# Patient Record
Sex: FEMALE | Race: ASIAN | NOT HISPANIC OR LATINO | Employment: OTHER | URBAN - METROPOLITAN AREA
[De-identification: names, ages, dates, MRNs, and addresses within clinical notes are randomized per-mention and may not be internally consistent; named-entity substitution may affect disease eponyms.]

---

## 2017-01-05 ENCOUNTER — APPOINTMENT (OUTPATIENT)
Dept: PHYSICAL THERAPY | Facility: MEDICAL CENTER | Age: 57
End: 2017-01-05
Payer: COMMERCIAL

## 2017-01-05 PROCEDURE — 97110 THERAPEUTIC EXERCISES: CPT

## 2017-01-05 PROCEDURE — 97140 MANUAL THERAPY 1/> REGIONS: CPT

## 2017-01-12 ENCOUNTER — APPOINTMENT (OUTPATIENT)
Dept: PHYSICAL THERAPY | Facility: MEDICAL CENTER | Age: 57
End: 2017-01-12
Payer: COMMERCIAL

## 2017-01-12 PROCEDURE — 97110 THERAPEUTIC EXERCISES: CPT

## 2017-01-12 PROCEDURE — 97140 MANUAL THERAPY 1/> REGIONS: CPT

## 2017-01-12 PROCEDURE — 97164 PT RE-EVAL EST PLAN CARE: CPT

## 2017-01-16 ENCOUNTER — GENERIC CONVERSION - ENCOUNTER (OUTPATIENT)
Dept: OTHER | Facility: OTHER | Age: 57
End: 2017-01-16

## 2017-01-18 ENCOUNTER — ALLSCRIPTS OFFICE VISIT (OUTPATIENT)
Dept: OTHER | Facility: OTHER | Age: 57
End: 2017-01-18

## 2017-01-19 ENCOUNTER — APPOINTMENT (OUTPATIENT)
Dept: PHYSICAL THERAPY | Facility: MEDICAL CENTER | Age: 57
End: 2017-01-19
Payer: COMMERCIAL

## 2017-01-24 ENCOUNTER — ALLSCRIPTS OFFICE VISIT (OUTPATIENT)
Dept: OTHER | Facility: OTHER | Age: 57
End: 2017-01-24

## 2017-01-24 DIAGNOSIS — R29.890 LOSS OF HEIGHT: ICD-10-CM

## 2017-01-24 DIAGNOSIS — M25.531 PAIN IN RIGHT WRIST: ICD-10-CM

## 2017-01-24 DIAGNOSIS — Z12.31 ENCOUNTER FOR SCREENING MAMMOGRAM FOR MALIGNANT NEOPLASM OF BREAST: ICD-10-CM

## 2017-01-26 ENCOUNTER — APPOINTMENT (OUTPATIENT)
Dept: PHYSICAL THERAPY | Facility: MEDICAL CENTER | Age: 57
End: 2017-01-26
Payer: COMMERCIAL

## 2017-02-15 ENCOUNTER — HOSPITAL ENCOUNTER (OUTPATIENT)
Dept: RADIOLOGY | Facility: MEDICAL CENTER | Age: 57
Discharge: HOME | End: 2017-02-15
Payer: COMMERCIAL

## 2017-02-15 ENCOUNTER — GENERIC CONVERSION - ENCOUNTER (OUTPATIENT)
Dept: OTHER | Facility: OTHER | Age: 57
End: 2017-02-15

## 2017-02-15 DIAGNOSIS — M25.531 PAIN IN RIGHT WRIST: ICD-10-CM

## 2017-02-15 PROCEDURE — 73110 X-RAY EXAM OF WRIST: CPT

## 2017-03-13 ENCOUNTER — ALLSCRIPTS OFFICE VISIT (OUTPATIENT)
Dept: OTHER | Facility: OTHER | Age: 57
End: 2017-03-13

## 2017-03-13 LAB — S PYO AG THROAT QL: NEGATIVE

## 2017-03-27 ENCOUNTER — ALLSCRIPTS OFFICE VISIT (OUTPATIENT)
Dept: OTHER | Facility: OTHER | Age: 57
End: 2017-03-27

## 2017-03-27 ENCOUNTER — HOSPITAL ENCOUNTER (OUTPATIENT)
Dept: RADIOLOGY | Facility: CLINIC | Age: 57
Discharge: HOME | End: 2017-03-27
Payer: COMMERCIAL

## 2017-03-27 DIAGNOSIS — M25.572 PAIN IN LEFT ANKLE: ICD-10-CM

## 2017-03-27 PROCEDURE — 73610 X-RAY EXAM OF ANKLE: CPT

## 2017-07-06 ENCOUNTER — GENERIC CONVERSION - ENCOUNTER (OUTPATIENT)
Dept: OTHER | Facility: OTHER | Age: 57
End: 2017-07-06

## 2017-07-10 ENCOUNTER — GENERIC CONVERSION - ENCOUNTER (OUTPATIENT)
Dept: OTHER | Facility: OTHER | Age: 57
End: 2017-07-10

## 2017-07-25 ENCOUNTER — ALLSCRIPTS OFFICE VISIT (OUTPATIENT)
Dept: OTHER | Facility: OTHER | Age: 57
End: 2017-07-25

## 2017-07-25 DIAGNOSIS — D64.9 ANEMIA: ICD-10-CM

## 2017-09-12 ENCOUNTER — GENERIC CONVERSION - ENCOUNTER (OUTPATIENT)
Dept: OTHER | Facility: OTHER | Age: 57
End: 2017-09-12

## 2017-09-26 ENCOUNTER — LAB CONVERSION - ENCOUNTER (OUTPATIENT)
Dept: OTHER | Facility: OTHER | Age: 57
End: 2017-09-26

## 2017-09-26 ENCOUNTER — TRANSCRIBE ORDERS (OUTPATIENT)
Dept: ADMINISTRATIVE | Facility: HOSPITAL | Age: 57
End: 2017-09-26

## 2017-09-26 ENCOUNTER — APPOINTMENT (OUTPATIENT)
Dept: LAB | Facility: HOSPITAL | Age: 57
End: 2017-09-26
Payer: COMMERCIAL

## 2017-09-26 DIAGNOSIS — D64.9 ANEMIA, UNSPECIFIED: ICD-10-CM

## 2017-09-26 DIAGNOSIS — D64.9 ANEMIA, UNSPECIFIED: Primary | ICD-10-CM

## 2017-09-26 LAB
ERYTHROCYTE [DISTWIDTH] IN BLOOD BY AUTOMATED COUNT: 12.7 % (ref 11.6–15.1)
HCT VFR BLD AUTO: 41.8 % (ref 34.8–46.1)
HGB BLD-MCNC: 14 G/DL (ref 11.5–15.4)
MCH RBC QN AUTO: 30.3 PG (ref 26.8–34.3)
MCHC RBC AUTO-ENTMCNC: 33.5 G/DL (ref 31.4–37.4)
MCV RBC AUTO: 91 FL (ref 82–98)
PLATELET # BLD AUTO: 242 THOUSANDS/UL (ref 149–390)
PMV BLD AUTO: 9.4 FL (ref 8.9–12.7)
RBC # BLD AUTO: 4.62 MILLION/UL (ref 3.81–5.12)
WBC # BLD AUTO: 3.8 THOUSAND/UL (ref 4.31–10.16)

## 2017-09-26 PROCEDURE — 85027 COMPLETE CBC AUTOMATED: CPT

## 2017-09-26 PROCEDURE — 36415 COLL VENOUS BLD VENIPUNCTURE: CPT

## 2017-10-18 ENCOUNTER — HOSPITAL ENCOUNTER (OUTPATIENT)
Dept: BONE DENSITY | Facility: MEDICAL CENTER | Age: 57
Discharge: HOME | End: 2017-10-18
Payer: COMMERCIAL

## 2017-10-18 ENCOUNTER — TRANSCRIBE ORDERS (OUTPATIENT)
Dept: ADMINISTRATIVE | Facility: HOSPITAL | Age: 57
End: 2017-10-18

## 2017-10-18 ENCOUNTER — APPOINTMENT (OUTPATIENT)
Dept: LAB | Facility: MEDICAL CENTER | Age: 57
End: 2017-10-18
Payer: COMMERCIAL

## 2017-10-18 ENCOUNTER — HOSPITAL ENCOUNTER (OUTPATIENT)
Dept: MAMMOGRAPHY | Facility: MEDICAL CENTER | Age: 57
Discharge: HOME | End: 2017-10-18
Payer: COMMERCIAL

## 2017-10-18 DIAGNOSIS — A64 SEXUALLY TRANSMITTED DISEASE: ICD-10-CM

## 2017-10-18 DIAGNOSIS — R29.890 LOSS OF HEIGHT: ICD-10-CM

## 2017-10-18 DIAGNOSIS — A64 SEXUALLY TRANSMITTED DISEASE: Primary | ICD-10-CM

## 2017-10-18 DIAGNOSIS — Z12.31 ENCOUNTER FOR SCREENING MAMMOGRAM FOR MALIGNANT NEOPLASM OF BREAST: ICD-10-CM

## 2017-10-18 PROCEDURE — G0202 SCR MAMMO BI INCL CAD: HCPCS

## 2017-10-18 PROCEDURE — 87389 HIV-1 AG W/HIV-1&-2 AB AG IA: CPT

## 2017-10-18 PROCEDURE — 77080 DXA BONE DENSITY AXIAL: CPT

## 2017-10-18 PROCEDURE — 36415 COLL VENOUS BLD VENIPUNCTURE: CPT

## 2017-10-19 ENCOUNTER — GENERIC CONVERSION - ENCOUNTER (OUTPATIENT)
Dept: OTHER | Facility: OTHER | Age: 57
End: 2017-10-19

## 2017-10-20 LAB — HIV 1+2 AB+HIV1 P24 AG SERPL QL IA: NORMAL

## 2017-11-03 ENCOUNTER — ALLSCRIPTS OFFICE VISIT (OUTPATIENT)
Dept: OTHER | Facility: OTHER | Age: 57
End: 2017-11-03

## 2017-11-03 ENCOUNTER — HOSPITAL ENCOUNTER (OUTPATIENT)
Dept: ULTRASOUND IMAGING | Facility: HOSPITAL | Age: 57
Discharge: HOME | End: 2017-11-03
Payer: COMMERCIAL

## 2017-11-03 ENCOUNTER — APPOINTMENT (OUTPATIENT)
Dept: LAB | Facility: HOSPITAL | Age: 57
End: 2017-11-03
Payer: COMMERCIAL

## 2017-11-03 DIAGNOSIS — R10.13 EPIGASTRIC PAIN: ICD-10-CM

## 2017-11-03 DIAGNOSIS — R10.11 RIGHT UPPER QUADRANT PAIN: ICD-10-CM

## 2017-11-03 LAB
ALBUMIN SERPL BCP-MCNC: 3.7 G/DL (ref 3.5–5)
ALP SERPL-CCNC: 111 U/L (ref 46–116)
ALT SERPL W P-5'-P-CCNC: 23 U/L (ref 12–78)
AMYLASE SERPL-CCNC: 24 IU/L (ref 25–115)
ANION GAP SERPL CALCULATED.3IONS-SCNC: 5 MMOL/L (ref 4–13)
AST SERPL W P-5'-P-CCNC: 17 U/L (ref 5–45)
BASOPHILS # BLD AUTO: 0.01 THOUSANDS/ΜL (ref 0–0.1)
BASOPHILS NFR BLD AUTO: 0 % (ref 0–1)
BILIRUB SERPL-MCNC: 0.35 MG/DL (ref 0.2–1)
BUN SERPL-MCNC: 12 MG/DL (ref 5–25)
CALCIUM SERPL-MCNC: 8.7 MG/DL (ref 8.3–10.1)
CHLORIDE SERPL-SCNC: 106 MMOL/L (ref 100–108)
CO2 SERPL-SCNC: 30 MMOL/L (ref 21–32)
CREAT SERPL-MCNC: 0.56 MG/DL (ref 0.6–1.3)
EOSINOPHIL # BLD AUTO: 0.16 THOUSAND/ΜL (ref 0–0.61)
EOSINOPHIL NFR BLD AUTO: 3 % (ref 0–6)
ERYTHROCYTE [DISTWIDTH] IN BLOOD BY AUTOMATED COUNT: 12.8 % (ref 11.6–15.1)
GFR SERPL CREATININE-BSD FRML MDRD: 103 ML/MIN/1.73SQ M
GLUCOSE SERPL-MCNC: 173 MG/DL (ref 65–140)
HCT VFR BLD AUTO: 39.1 % (ref 34.8–46.1)
HGB BLD-MCNC: 13.2 G/DL (ref 11.5–15.4)
LIPASE SERPL-CCNC: 196 U/L (ref 73–393)
LYMPHOCYTES # BLD AUTO: 1.47 THOUSANDS/ΜL (ref 0.6–4.47)
LYMPHOCYTES NFR BLD AUTO: 31 % (ref 14–44)
MCH RBC QN AUTO: 30.4 PG (ref 26.8–34.3)
MCHC RBC AUTO-ENTMCNC: 33.8 G/DL (ref 31.4–37.4)
MCV RBC AUTO: 90 FL (ref 82–98)
MONOCYTES # BLD AUTO: 0.43 THOUSAND/ΜL (ref 0.17–1.22)
MONOCYTES NFR BLD AUTO: 9 % (ref 4–12)
NEUTROPHILS # BLD AUTO: 2.71 THOUSANDS/ΜL (ref 1.85–7.62)
NEUTS SEG NFR BLD AUTO: 57 % (ref 43–75)
NRBC BLD AUTO-RTO: 0 /100 WBCS
PLATELET # BLD AUTO: 203 THOUSANDS/UL (ref 149–390)
PMV BLD AUTO: 9.8 FL (ref 8.9–12.7)
POTASSIUM SERPL-SCNC: 3.4 MMOL/L (ref 3.5–5.3)
PROT SERPL-MCNC: 7 G/DL (ref 6.4–8.2)
RBC # BLD AUTO: 4.34 MILLION/UL (ref 3.81–5.12)
SODIUM SERPL-SCNC: 141 MMOL/L (ref 136–145)
WBC # BLD AUTO: 4.78 THOUSAND/UL (ref 4.31–10.16)

## 2017-11-03 PROCEDURE — 83690 ASSAY OF LIPASE: CPT

## 2017-11-03 PROCEDURE — 36415 COLL VENOUS BLD VENIPUNCTURE: CPT

## 2017-11-03 PROCEDURE — 80053 COMPREHEN METABOLIC PANEL: CPT

## 2017-11-03 PROCEDURE — 85025 COMPLETE CBC W/AUTO DIFF WBC: CPT

## 2017-11-03 PROCEDURE — 82150 ASSAY OF AMYLASE: CPT

## 2017-11-03 PROCEDURE — 76700 US EXAM ABDOM COMPLETE: CPT

## 2017-11-04 NOTE — PROGRESS NOTES
Assessment  1  Abdominal pain, right upper quadrant (789 01) (R10 11)   2  Abdominal pain, epigastric (789 06) (R10 13)   3  Esophageal reflux (530 81) (K21 9)    Plan  Abdominal pain, epigastric    · PriLOSEC OTC 20 MG Oral Tablet Delayed Release; use 2 daily x 3 days then 1 as  needed   · (1) AMYLASE; Status:Active; Requested [id number];    · (1) CBC/PLT/DIFF; Status:Active; Requested RDA:09NMJ1979;    · (1) COMPREHENSIVE METABOLIC PANEL; Status:Active; Requested QNN:37LJU0273;    · (1) LIPASE; Status:Active; Requested QSP:75RTL9202; Abdominal pain, epigastric, Abdominal pain, right upper quadrant    · * US ABDOMEN COMPLETE; Status:Active; Requested for:2017;     Discussion/Summary    Regarding significant pain she is having epigastric/right upper quadrant am concerned about gallbladder disease, I would like her to do an ultrasound attention right upper quadrant, also do blood work  she did obtain relief with Prilosec OTC 20 mg I would like her to use this 2 pills daily x3 days then 1 daily as needed  pain continues and ultrasound is negative for gallbladder disease we should plan EGD, had seen [facility] [address] with colonoscopy , history of polyp ( Dr Burgess Cassidy))  worsens over weekend- to ER   lightly   NOTE - she did have pain to a degree 3 weeks ago - but did just start Boniva - Nov 1 was first dose ** - reassess boniva use after testing        Chief Complaint  stomach pain x 2 days      History of Present Illness  HPI: Few weeks ago she had pain epigastric that radiated to her right thoracic area, she used OTC Prilosec and resolved after 3 days - stopped PPI - pain recurred 2 days ago - was worse yesterday - thought might have to go to ER w intensity - observed - improved but still significant todayafter eating ( ate pizza today)nausea, vomit, fever have acid refluxchange bowel - daily No melena/ bleeding - is on ironnotes soreness to touch right thoracic  NOTE - she did have pain to a degree 3 weeks ago - but did just start Boniva Nov 1 was first dose ** ( see prev hx re fracture etc)      Review of Systems    Constitutional: as noted in HPI,-- no fever,-- no recent weight gain,-- no chills-- and-- no recent weight loss  ENT: no nosebleeds  Cardiovascular: the heart rate was not slow,-- no chest pain,-- the heart rate was not fast,-- no palpitations-- and-- no lower extremity edema  Respiratory: no shortness of breath,-- no cough-- and-- no wheezing  Breasts: no breast pain  Gastrointestinal: as noted in HPI  Genitourinary: no dysuria  Neurological: no headache,-- no confusion,-- no dizziness-- and-- no fainting  Active Problems  1  Abnormal brain scan (794 09) (R94 02)   2  Allergic rhinitis (477 9) (J30 9)   3  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)   4  De Quervain's tenosynovitis (727 04) (M65 4)   5  Eczema (692 9) (L30 9)   6  Esophageal reflux (530 81) (K21 9)   7  Essential hypertension (401 9) (I10)   8  Fatigue (780 79) (R53 83)   9  Functional murmur (R01 0)   10  History of Hip Surgery Left   11  History of acute sinusitis (V12 69) (Z87 09)   12  Hypercholesterolemia (272 0) (E78 00)   13  Hyperglycemia (790 29) (R73 9)   14  Knee osteoarthritis (715 36) (M17 9)   15  Loss of height (781 91) (R29 890)   16  Osteopenia (733 90) (M85 80)   17  Postoperative anemia (285 9) (D64 9)   18  Right wrist pain (719 43) (M25 531)   19  Stress Incontinence (788 39)   20  Tear film insufficiency, unspecified laterality (375 15) (H04 129)   21  History of Treatment Of The Left Ankle   22  Trimalleolar fracture of left ankle (824 6) (X29 853H)    Past Medical History  1  History of Acute bronchitis (466 0) (J20 9)   2  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)   3  History of Hepatitis, B Virus (070 30)   4  History of acute sinusitis (V12 69) (Z87 09)   5  History of Nocturia (788 43) (R35 1)   6  History of Pregnancy History   7  History of Sinusitis (473 9) (J32 9)   8   History of Vertigo (780 4) (R42)   9  History of Vertigo (780 4) (R42)   10  History of Viral hepatitis A without coma (070 1) (B15 9)  Active Problems And Past Medical History Reviewed: The active problems and past medical history were reviewed and updated today  Family History  Mother    1  Family history of Benign Essential Hypertension   2  Family history of Diabetes Mellitus (V18 0)    Social History   · Never Drank Alcohol   · Never smoker   ·   The social history was reviewed and updated today  Surgical History  1  History of Cataract Surgery   2  History of  Section   3  History of Hip Surgery Left   4  History of Treatment Of The Left Ankle  Surgical History Reviewed: The surgical history was reviewed and updated today  Current Meds   1  AmLODIPine Besylate 2 5 MG Oral Tablet; take 1 tablet by mouth once daily as directed; Therapy: 61VUW0767 to (OGVRFYKJ:87VJB8204)  Requested for: 16JQE8195; Last   Rx:2017 Ordered   2  Aspirin 81 MG TABS; TAKE 1 TABLET DAILY starting 17; Therapy: (Recorded:73Rqt4268) to Recorded   3  Calcium 500+D 500-200 MG-UNIT Oral Tablet; Therapy: (Dolly James) to Recorded   4  Ferrous Sulfate 325 (65 Fe) MG Oral Tablet; take one tablet 3 times a week; Therapy: (Recorded:46Jot3641) to Recorded   5  Fluticasone Propionate 50 MCG/ACT Nasal Suspension; instill 2 sprays into each nostril   once daily; Therapy: 08Joe2446 to (Evaluate:68Sbl7277)  Requested for: 07Rbz0400; Last   Rx:51Uqx6838 Ordered   6  Ibandronate Sodium 150 MG Oral Tablet; TAKE 1 TABLET ONCE MONTHLY WITH 8 TO   10 OUNCES OF WATER  STAY UPRIGHTAND DO NOT EAT OR DRINK FOR 1 HOUR;   Therapy: 66PTH8434 to (Graciela Nuno)  Requested for: 34JRG9297; Last   Rx:2017 Ordered   7  Melatonin 3 MG Oral Tablet; TAKE one hour before bedtime as needed; Therapy: (Recorded:66Aad2087) to Recorded   8  Refresh Tears SOLN;   Therapy: (Recorded:27Vbq9588) to Recorded   9   Tylenol 325 MG Oral Tablet; Therapy: 75HTI4513 to Recorded   10  Vitamin D3 2000 UNIT Oral Capsule; take 1 capsule daily; Therapy: (Recorded:61Yhm8433) to Recorded    The medication list was reviewed and updated today  Allergies  1  Verapamil HCl TABS  2  No Known Environmental Allergies    Vitals   Recorded: 48XOA4029 02:41PM   Temperature 98 7 F   Heart Rate 72   Systolic 192   Diastolic 70   Height 5 ft 1 4 in   Weight 135 lb 8 oz   BMI Calculated 25 27   BSA Calculated 1 61     Physical Exam    Constitutional 17-year-old female seated on table mild distress, afebrile  Eyes   Conjunctiva and lids: No swelling, erythema or discharge  -- No pallor, no icterus  Pulmonary   Auscultation of lungs: Clear to auscultation  Cardiovascular   Auscultation of heart: Normal rate and rhythm, normal S1 and S2, without murmurs  -- No ankle edema  Abdomen Soft, mild to moderately tender right upper quadrant/epigastric without guarding, no rebound  No flank tenderness, no skin rash thoracic  Lymphatic   Palpation of lymph nodes in neck: No lymphadenopathy  Musculoskeletal Nontender cervical/thoracic spine     Psychiatric   Orientation to person, place, and time: Normal     Mood and affect: Normal          Future Appointments    Date/Time Provider Specialty Site   11/07/2017 11:30 AM Braydon Gibbs DO Family Medicine [address] FAM[facility] MED[facility]     Signatures   Electronically signed by : Jacy Cooney DO; Nov  3 2017  4:08PM EST                       (Author)

## 2017-11-05 ENCOUNTER — GENERIC CONVERSION - ENCOUNTER (OUTPATIENT)
Dept: OTHER | Facility: OTHER | Age: 57
End: 2017-11-05

## 2017-11-07 ENCOUNTER — ALLSCRIPTS OFFICE VISIT (OUTPATIENT)
Dept: OTHER | Facility: OTHER | Age: 57
End: 2017-11-07

## 2017-11-07 LAB — HBA1C MFR BLD HPLC: 6 %

## 2018-01-11 NOTE — RESULT NOTES
Verified Results  (1) COMPREHENSIVE METABOLIC PANEL 50OOL9229 96:81AE Jorge Krishnan Order Number: [id number]      National Kidney Disease Education Program recommendations are as follows:  GFR calculation is accurate only with a steady state creatinine  Chronic Kidney disease less than 60 ml/min/1 73 sq  meters  Kidney failure less than 15 ml/min/1 73 sq  meters  Test Name Result Flag Reference   GLUCOSE,RANDM 124 mg/dL     SODIUM 142 mmol/L  136-145   POTASSIUM 4 1 mmol/L  3 5-5 3   CHLORIDE 103 mmol/L  100-108   CARBON DIOXIDE 32 mmol/L  21-32   ANION GAP (CALC) 7 mmol/L  4-13   BLOOD UREA NITROGEN 9 mg/dL  5-25   CREATININE 0 47 mg/dL L 0 60-1 30   CALCIUM 9 2 mg/dL  8 3-10 1   BILI, TOTAL 0 52 mg/dL  0 20-1 00   ALK PHOSPHATAS 117 U/L H    ALT (SGPT) 35 U/L  12-78   AST(SGOT) 17 U/L  5-45   ALBUMIN 3 9 g/dL  3 5-5 0   TOTAL PROTEIN 7 3 g/dL  6 4-8 2   eGFR Non-African American      >60 0 ml/min/1 73sq m     (1) CBC/PLT/DIFF 21Jan2016 02:01PM Jorge Krishnan Order Number: [id number]     Order Number: [id number]     Test Name Result Flag Reference   WBC COUNT 5 65 Thousand/uL  4 31-10 16   RBC COUNT 4 65 Million/uL  3 81-5 12   HEMOGLOBIN 14 2 g/dL  11 5-15 4   HEMATOCRIT 41 8 %  34 8-46  1   MCV 89 9 fL  82 0-98 0   MCH 30 5 pg  26 8-34 3   MCHC 34 0 g/dL  31 4-37 4   RDW 12 5 %  11 6-15 1   MPV 10 1 fL  8 9-12 7   PLATELET COUNT 023 Thousands/uL  149-390   nRBC AUTOMATED 0 /100 WBCs     NEUTROPHILS RELATIVE PERCENT 44 %  43-75   LYMPHOCYTES RELATIVE PERCENT 40 %  14-44   MONOCYTES RELATIVE PERCENT 11 %  4-12   EOSINOPHILS RELATIVE PERCENT 5 %  0-6   BASOPHILS RELATIVE PERCENT 0 %  0-1   NEUTROPHILS ABSOLUTE COUNT 2 54 Thousands/µL  1 85-7 62   LYMPHOCYTES ABSOLUTE COUNT 2 24 Thousands/µL  0 60-4 47   MONOCYTES ABSOLUTE COUNT 0 60 Thousand/µL  0 17-1 22   EOSINOPHILS ABSOLUTE COUNT 0 26 Thousand/µL  0 00-0 61   BASOPHILS ABSOLUTE COUNT 0 01 Thousands/µL  0 00-0 10     (1) TSH WITH FT4 REFLEX 21Jan2016 02:01PM Cathlean Schlatter Order Number: [id number]     Test Name Result Flag Reference   TSH 3 950 uIU/mL H 0 358-3 740   T4,FREE 0 95 ng/dL  0 76-1 46

## 2018-01-12 VITALS
WEIGHT: 133 LBS | DIASTOLIC BLOOD PRESSURE: 68 MMHG | SYSTOLIC BLOOD PRESSURE: 122 MMHG | BODY MASS INDEX: 25.11 KG/M2 | HEART RATE: 72 BPM | HEIGHT: 61 IN

## 2018-01-12 NOTE — RESULT NOTES
Verified Results  * MAMMO SCREENING BILATERAL W CAD 81PCX3063 03:03PM Miriam Rojas Order Number: [id number]   Performing Comments: last was 3/15   - Patient Instructions: To schedule this appointment, please contact Central Scheduling at [phone]  Do not wear any perfume, powder, lotion or deodorant on breast or underarm area  Please bring your doctors order, referral (if needed) and insurance information with you on the day of the test  Failure to bring this information may result in this test being rescheduled  Arrive 15 minutes prior to your appointment time to register  On the day of your test, please bring any prior mammogram or breast studies with you that were not performed at a [facility]  Failure to bring prior exams may result in your test needing to be rescheduled  Test Name Result Flag Reference   MAMMO SCREENING BILATERAL W CAD (Report)     Patient History:   Patient is postmenopausal    No known family history of cancer  No Hormone Replacement Therapy   Patient has never smoked  Patient's BMI is 28 3  Reason for exam: screening, asymptomatic  Mammo Screening Bilateral W CAD: October 18, 2017 - Check In #:    [de-identified]   Bilateral MLO and CC view(s) were taken  Technologist: Kenzie Arreola, RT(R)(M)   Prior study comparison: March 4, 2015, bilateral digital    screening mammogram performed at [facility] 112  July 29, 2008, bilateral DIGITAL SCREENING    MAMMOGRAM performed at [facility] 89  February 3, 2005, bilateral diagnostic mammogram    [place] [address] [facility]  The breast tissue is heterogeneously dense, potentially limiting    the sensitivity of mammography  Patient risk, included in this    report, assists in determining the appropriate screening regimen    (such as 3-D mammography or the inclusion of automated breast    ultrasound or MRI)   3-D mammography may also remain indicated as    screening  The parenchymal pattern appears stable  No dominant soft tissue    mass or suspicious calcifications are noted  The skin and nipple   contours are within normal limits  No mammographic evidence of malignancy  No    significant changes when compared with prior studies  ACR BI-RADSï¾® Assessments: BiRad:1 - Negative     Recommendation:   Routine screening mammogram in 1 year  Analyzed by CAD     The patient is scheduled in a reminder system for screening    mammography  8-10% of cancers will be missed on mammography  Management of a    palpable abnormality must be based on clinical grounds  Patients   will be notified of their results via letter from our facility  Accredited by [employer] of Radiology and FDA       Transcription Location: [facility] 98: [id number]     Risk Value(s):   Tyrer-Cuzick 10 Year: 2 500%, Tyrer-Cuzick Lifetime: 6 900%,    Myriad Table: 1 5%, COMPA 5 Year: 1 1%, NCI Lifetime: 6 3%   Signed by:   Júnior Way MD   10/19/17

## 2018-01-12 NOTE — PROGRESS NOTES
Assessment   1  Encounter for preventive health examination (V70 0) (Z00 00)  2  Never smoker  3  Flu vaccine need (V04 81) (Z23)    Plan  Allergic rhinitis    · Renew: Fluticasone Propionate 50 MCG/ACT Nasal Suspension; instill 2 sprays into each  nostril once daily  Encounter for screening mammogram for breast cancer    · * MAMMO SCREENING BILATERAL W CAD; Status:Hold For - Scheduling; Requested  WLE:81LIO5308;   Essential hypertension    · Renew: AmLODIPine Besylate 2 5 MG Oral Tablet; take 1 tablet by mouth once daily as  directed  Flu vaccine need    · Administered: Fluzone Quadrivalent 0 5 ML Intramuscular Suspension  Loss of height    · * DXA BONE DENSITY SPINE HIP AND PELVIS; Status:Hold For - Scheduling;  Requested PZV:81BGT3630;   Need for revaccination    · Administered: RVAC-Adacel 5-2-15 5 LF-MCG/0 5 Intramuscular Suspension    Discussion/Summary  health maintenance visit     Is doing well re late Sept surgery for left ankle fx  - She does check home glucometer on occ and runs around 100 - last A1C in Nov was ok at 5 8 BMP/ CBC ok in Sept Had TSH 1/16  -BP controlled - use Amlodipine as is     -Last saw her Gyn- Dr Marine Schroeder- 3 yrs or so ago - unable to get ahold of them to set up re-eval and we gave # for Gyn to see w [facility]  do mammogram     -Check DEXA w hx height loss and fx ankle - use Vit D as is     - stay w aspirin as is - 2013 MRI/ MRA head was stable vs 2011 re right gliosis    - had single polyp on colonoscopy 2013 w Dr Maru Henderson- redo 2018    - recheck here 6 months- call sooner as needed    Did receive Flu shot and revaccination w TDaP today  Chief Complaint  Physical      History of Present Illness  HPI: in for reg PE - still struggling w husbands death 7 months ago - daughter and her  moved back from [address] to live w her  - she works few hrs a day, 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   Considering trip in April for a month to [facility]    Had ankle fx left in Sept- had surgery and is healing well     Has some congestion Uses Advil cold and sinus on occasion  Has some pain right thumb - MCP Jt - no trauma - had seen ortho - last was 8/16    Home glucometer = checks on occ - runs around 100 Has lost 10 lbs past year         Review of Systems    Constitutional: as noted in HPI, no fever and no chills  ENT: mild chronic congestion, but no earache, no sore throat and no hoarseness  Cardiovascular: No complaints of slow heart rate, no fast heart rate, no chest pain, no palpitations, no leg claudication, no lower extremity edema  Respiratory: No complaints of shortness of breath, no wheezing, no cough, no SOB on exertion, no orthopnea, no PND  Gastrointestinal: no abdominal pain, no nausea, no vomiting and no blood in stools  Genitourinary: no dysuria  Neurological: no headache, no confusion, no dizziness, no limb weakness, no convulsions, no fainting and no difficulty walking  Psychiatric: as noted in HPI  Hematologic/Lymphatic: No complaints of swollen glands, no swollen glands in the neck, does not bleed easily, does not bruise easily  Active Problems   1  Abnormal brain scan (794 09) (R94 02)  2  Allergic rhinitis (477 9) (J30 9)  3  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)  4  De Quervain's tenosynovitis (727 04) (M65 4)  5  Eczema (692 9) (L30 9)  6  Esophageal Reflux  7  Essential hypertension (401 9) (I10)  8  Fatigue (780 79) (R53 83)  9  Functional murmur (R01 0)  10  History of acute sinusitis (V12 69) (Z87 09)  11  Hypercholesterolemia (272 0) (E78 00)  12  Hyperglycemia (790 29) (R73 9)  13  Knee osteoarthritis (715 36) (M17 9)  14  Left knee pain (719 46) (M25 562)  15  Need for revaccination (V05 9) (Z23)  16  Patellofemoral arthralgia of left knee (719 46) (M25 562)  17  Right wrist pain (719 43) (M25 531)  18  Status post open reduction with internal fixation of fracture (V45 89,V15 51)    (Z96 7,Z87 81)  19  Stress Incontinence (788 39)  20   Tear film insufficiency, unspecified laterality (375 15) (H04 129)  21  Trimalleolar fracture of left ankle (824 6) (L61 627G)    Past Medical History    · History of Acute bronchitis (466 0) (J20 9)   · 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)   · History of Hepatitis, B Virus (070 30)   · History of acute sinusitis (V12 69) (Z87 09)   · History of Nocturia (788 43) (R35 1)   · History of Pregnancy History   · History of Sinusitis (473 9) (J32 9)   · History of Vertigo (780 4) (R42)   · History of Vertigo (780 4) (R42)   · History of Viral hepatitis A without coma (070 1) (B15 9)    Surgical History    · History of Cataract Surgery   · History of  Section    Family History  Mother    · Family history of Benign Essential Hypertension   · Family history of Diabetes Mellitus (V18 0)    Social History    · Never Drank Alcohol   · Never smoker   ·     Current Meds  1  AmLODIPine Besylate 2 5 MG Oral Tablet; take 1 tablet by mouth once daily as directed; Therapy: 42OOW3344 to (Evaluate:2017)  Requested for: 2016; Last   Rx:2016 Ordered  2  Aspirin 81 MG TABS; 1 pill ; Therapy: (Recorded:98Vhi0058) to Recorded  3  Calcium 500+D 500-200 MG-UNIT Oral Tablet; Therapy: (Kathren Form) to Recorded  4  Fluticasone Propionate 50 MCG/ACT Nasal Suspension; instill 2 sprays into each nostril   once daily; Therapy: 42Eki2661 to (Evaluate:78Urw4478)  Requested for: 46VTQ2678; Last   Rx:11Wxu2630 Ordered  5  Refresh Tears SOLN;   Therapy: (Recorded:87Ydz7658) to Recorded  6  Tylenol 325 MG Oral Tablet; Therapy: 95NVE7969 to Recorded  7  Vitamin D3 2000 UNIT Oral Capsule; take 1 capsule daily; Therapy: (Recorded:14Bye1298) to Recorded    Allergies   1  Verapamil HCl TABS   2   No Known Environmental Allergies    Vitals   Recorded: S1507713 11:39AM   Heart Rate 80   Systolic 447   Diastolic 66   Height 5 ft 1 5 in   Weight 136 lb 6 08 oz   BMI Calculated 25 35   BSA Calculated 1 61 Physical Exam    Constitutional pleasant female seated NAD other than becomes tearful discussing   Head and Face   Head and face: Normal     Eyes   Conjunctiva and lids: No swelling, erythema or discharge  Ears, Nose, Mouth, and Throat   Otoscopic examination: Tympanic membranes translucent with normal light reflex  Canals patent without erythema  Hearing: Normal     Oropharynx: Normal with no erythema, edema, exudate or lesions  Neck   Neck: Supple, symmetric, trachea midline, no masses  Thyroid: Normal, no thyromegaly  Pulmonary   Auscultation of lungs: Clear to auscultation  Cardiovascular RRR 1/6 SPARKLE R2ics  no edema left ankle scar  Abdomen   Abdomen: Non-tender, no masses  Lymphatic   Palpation of lymph nodes in neck: No lymphadenopathy  Psychiatric   Judgment and insight: Normal     Orientation to person, place, and time: Normal     Recent and remote memory: Intact         Signatures   Electronically signed by : Marlis Nissen, DO; Jan 24 2017  3:48PM EST                       (Author)

## 2018-01-13 VITALS
DIASTOLIC BLOOD PRESSURE: 66 MMHG | HEART RATE: 80 BPM | SYSTOLIC BLOOD PRESSURE: 126 MMHG | WEIGHT: 136.38 LBS | HEIGHT: 62 IN | BODY MASS INDEX: 25.1 KG/M2

## 2018-01-14 VITALS
DIASTOLIC BLOOD PRESSURE: 80 MMHG | WEIGHT: 135 LBS | HEART RATE: 76 BPM | HEIGHT: 62 IN | BODY MASS INDEX: 24.84 KG/M2 | SYSTOLIC BLOOD PRESSURE: 117 MMHG

## 2018-01-14 VITALS
SYSTOLIC BLOOD PRESSURE: 136 MMHG | WEIGHT: 137.5 LBS | BODY MASS INDEX: 25.3 KG/M2 | HEIGHT: 62 IN | HEART RATE: 76 BPM | DIASTOLIC BLOOD PRESSURE: 77 MMHG

## 2018-01-14 VITALS
TEMPERATURE: 99.8 F | BODY MASS INDEX: 25.09 KG/M2 | RESPIRATION RATE: 16 BRPM | HEART RATE: 100 BPM | DIASTOLIC BLOOD PRESSURE: 96 MMHG | SYSTOLIC BLOOD PRESSURE: 132 MMHG | WEIGHT: 135 LBS

## 2018-01-15 VITALS
DIASTOLIC BLOOD PRESSURE: 70 MMHG | SYSTOLIC BLOOD PRESSURE: 118 MMHG | HEIGHT: 61 IN | BODY MASS INDEX: 25.58 KG/M2 | HEART RATE: 72 BPM | TEMPERATURE: 98.7 F | WEIGHT: 135.5 LBS

## 2018-01-15 NOTE — MISCELLANEOUS
Chief Complaint  Chief Complaint Free Text Note Form: not seen w/in 2 weeks      History of Present Illness  TCM Communication [facility]: The patient is being contacted for follow-up after hospitalization  Hospital records were not available  She was hospitalized at 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)   5  De Quervain's tenosynovitis (727 04) (M65 4)   6  Eczema (692 9) (L30 9)   7  Esophageal Reflux   8  Essential hypertension (401 9) (I10)   9  Fatigue (780 79) (R53 83)   10  Flu-like symptoms (780 99) (R68 89)   11  Functional murmur (R01 0)   12  History of acute sinusitis (V12 69) (Z87 09)   13  Hypercholesterolemia (272 0) (E78 00)   14  Hyperglycemia (790 29) (R73 9)   15  Knee osteoarthritis (715 36) (M17 9)   16  Left ankle pain (719 47) (M25 572)   17  Left knee pain (719 46) (M25 562)   18  Loss of height (781 91) (R29 890)   19  Patellofemoral arthralgia of left knee (719 46) (M25 562)   20  Right wrist pain (719 43) (M25 531)   21  Status post open reduction with internal fixation of fracture (V45 89,V15 51)    (Z96 7,Z87 81)   22  Stress Incontinence (788 39)   23  Tear film insufficiency, unspecified laterality (375 15) (H04 129)   24  Trimalleolar fracture of left ankle (824 6) (X85 308K)    Past Medical History    1  History of Acute bronchitis (466 0) (J20 9)   2  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)   3  History of Hepatitis, B Virus (070 30)   4  History of acute sinusitis (V12 69) (Z87 09)   5  History of Nocturia (788 43) (R35 1)   6  History of Pregnancy History   7  History of Sinusitis (473 9) (J32 9)   8  History of Vertigo (780 4) (R42)   9  History of Vertigo (780 4) (R42)   10  History of Viral hepatitis A without coma (070 1) (B15 9)    Surgical History    1  History of Cataract Surgery   2  History of  Section    Family History  Mother    1  Family history of Benign Essential Hypertension   2  Family history of Diabetes Mellitus (V18 0)    Social History    · Never Drank Alcohol   · Never smoker   ·     Current Meds   1  AmLODIPine Besylate 2 5 MG Oral Tablet; take 1 tablet by mouth once daily as directed; Therapy: 19HWN1933 [facility]  Requested for: 05UND0993; Last   Rx:2017 Ordered   2  Aspirin 81 MG TABS; 1 pill ; Therapy: (Recorded:00Rpn5334) to Recorded   3  Calcium 500+D 500-200 MG-UNIT Oral Tablet; Therapy: (Maureen Mercedes) to Recorded   4  Fluticasone Propionate 50 MCG/ACT Nasal Suspension; instill 2 sprays into each nostril   once daily; Therapy: 34Uez1545 to (Evaluate:2018)  Requested for: 09RUY2671; Last   Rx:2017 Ordered   5  Refresh Tears SOLN;   Therapy: (Recorded:81Fya5530) to Recorded   6  Tylenol 325 MG Oral Tablet; Therapy: 85IAS9004 to Recorded   7  Vitamin D3 2000 UNIT Oral Capsule; take 1 capsule daily; Therapy: (Recorded:37Urd0672) to Recorded    Allergies    1  Verapamil HCl TABS    2   No Known Environmental Allergies    Future Appointments    Date/Time Provider Specialty Site   2017 02:30 PM Raffaele Smith DO Family Medicine [address] FAMILY MEDICINE     Signatures   Electronically signed by : Ankush Mcleod, ; 2017  6:53PM EST                       (Author)    Electronically signed by : Mary Barbour DO; 2017  8:55AM EST                       (Author)

## 2018-01-15 NOTE — RESULT NOTES
Verified Results  * DXA BONE DENSITY SPINE HIP AND PELVIS [id number] 03:05PM Bing Hester Order Number: [id number]   Performing Comments: has loss height along w fracture left ankle -  do DEXA scan   - Patient Instructions: To schedule this appointment, please contact Central Scheduling at [phone]  Test Name Result Flag Reference   DXA BONE DENSITY SPINE HIP AND PELVIS (Report)     CENTRAL DXA SCAN     CLINICAL HISTORY:  60year old post-menopausal  female risk factors include estrogen deficiency  Personal history fracture left hip 2017, right hip examined in this patient  TECHNIQUE: Bone densitometry was performed using a [employer]s W bone densitometer  Regions of interest appear properly placed  There are no obvious fractures or other confounding variables which could limit the study  None     COMPARISON: Baseline     RESULTS:    LUMBAR SPINE: L1-L4:   BMD 0 806 gm/cm2   T-score below normal, -2 2   Z-score -0 9     RIGHT TOTAL HIP:   BMD 0 735 gm/cm2   T-score below normal, -1 7   Z-score -0 8     RIGHT FEMORAL NECK:   BMD 0 581 gm/cm2   T-score below normal, -2 4   Z-score -1 2     Follow-up in this patient is very important and may lead to a treatment threshold       IMPRESSION:   1  Based on the [facility] classification, this study identifies a diagnosis of osteopenia, moderate at the femoral neck and spine areas and the patient is considered at increased risk for fracture  2  A daily intake of calcium of at least 1200 mg and vitamin D, 800-1000 IU, as well as weight bearing and muscle strengthening exercise, fall prevention and avoidance of tobacco and excessive alcohol intake as basic preventive measures are recommended  3  Repeat DXA scan on the same equipment in 18-24 months as clinically indicated         The 10 year risk of hip fracture is 1 7%, with the 10 year risk of major osteoporotic fracture being 9 7%, as calculated by the [facility] fracture risk assessment tool (FRAX)  The current NOF guidelines recommend treating patients with FRAX 10 year risk score   of >3% for hip fracture and >20% for major osteoporotic fracture  WHO CLASSIFICATION:   Normal (a T-score of -1 0 or higher)   Low bone mineral density (a T-score of less than -1 0 but higher than -2 5)   Osteoporosis (a T-score of -2 5 or less)   Severe osteoporosis (a T-score of -2 5 or less with a fragility fracture)     Thank you for allowing us the opportunity to participate in your patient care  The expanded DXA report will no longer be arriving in your mail  If you desire to view the full report please contact [address] [place] [address] or access the PACS system          Workstation performed: [id number]     Signed by:   Judith García MD   10/20/17       Plan  Loss of height, Osteopenia    · Ibandronate Sodium 150 MG Oral Tablet; TAKE 1 TABLET ONCE MONTHLY WITH  8 TO 10 OUNCES OF WATER  STAY UPRIGHTAND DO NOT EAT OR DRINK FOR 1  HOUR

## 2018-01-16 NOTE — RESULT NOTES
Verified Results  (1) CBC/ PLT (NO DIFF) [id number] 09:23AM Cynthia Caraballo     Test Name Result Flag Reference   HEMATOCRIT 41 8 %  34 8-46 1   HEMOGLOBIN 14 0 g/dL  11 5-15 4   MCHC 33 5 g/dL  31 4-37 4   MCH 30 3 pg  26 8-34 3   MCV 91 fL  82-98   PLATELET COUNT 473 Thousands/uL  149-390   RBC COUNT 4 62 Million/uL  3 81-5 12   RDW 12 7 %  11 6-15 1   WBC COUNT 3 80 Thousand/uL L 4 31-10 16   MPV 9 4 fL  8 9-12 7

## 2018-01-16 NOTE — RESULT NOTES
Verified Results  * US ABDOMEN COMPLETE [id number] 07:32PM Shameka Lucoi Order Number: [id number]   Performing Comments: She has significant epigastric/right upper quadrant pain that radiates to thoracic spine, do ultrasound, rule out gallbladder disease   - Patient Instructions: To schedule this appointment, please contact Central Scheduling at [phone]  Test Name Result Flag Reference   US ABDOMEN COMPLETE (Report)     ABDOMEN ULTRASOUND, COMPLETE WITH DOPPLER     INDICATION: Epigastric right upper quadrant abdominal pain  Rule out gallbladder disease  COMPARISON: Abdomen and pelvic CT from the 9/24/2010  TECHNIQUE:  Real-time ultrasound of the abdomen was performed with a curvilinear transducer with both volumetric sweeps and still imaging techniques  FINDINGS:     PANCREAS: Visualized portions of the pancreas are within normal limits  AORTA AND IVC: Visualized portions are normal for patient age  LIVER:   Size: Within normal range  The liver measures 12 5 cm in the midclavicular line  Contour: Surface contour is smooth  Parenchyma: Echogenicity and echotexture are within normal limits  No evidence of suspicious mass  Limited imaging of the main portal vein shows it to be patent and hepatopetal      BILIARY:   The gallbladder is normal in caliber  No wall thickening or pericholecystic fluid  No stones or sludge identified  Sonographic Venetta Lakshmi sign is negative  No intrahepatic biliary dilatation  CBD measures 4 mm  No choledocholithiasis  KIDNEY:    Right kidney measures 10 4 cm  Within normal limits  Left kidney measures 11 5 cm  Within normal limits  SPLEEN:    Measures 9 4 cm  There is a 1 8 cm simple cyst in the spleen  ASCITES: None  IMPRESSION:     There is no evidence of cholelithiasis or acute cholecystitis         Workstation performed: [id number]     Signed by:   Zina Mack MD   11/3/17     (1) CBC/PLT/DIFF [id number] 04:59PM Michelle Garsia Order Number: [id number]     Test Name Result Flag Reference   WBC COUNT 4 78 Thousand/uL  4 31-10 16   RBC COUNT 4 34 Million/uL  3 81-5 12   HEMOGLOBIN 13 2 g/dL  11 5-15 4   HEMATOCRIT 39 1 %  34 8-46  1   MCV 90 fL  82-98   MCH 30 4 pg  26 8-34 3   MCHC 33 8 g/dL  31 4-37 4   RDW 12 8 %  11 6-15 1   MPV 9 8 fL  8 9-12 7   PLATELET COUNT 162 Thousands/uL  149-390   nRBC AUTOMATED 0 /100 WBCs     NEUTROPHILS RELATIVE PERCENT 57 %  43-75   LYMPHOCYTES RELATIVE PERCENT 31 %  14-44   MONOCYTES RELATIVE PERCENT 9 %  4-12   EOSINOPHILS RELATIVE PERCENT 3 %  0-6   BASOPHILS RELATIVE PERCENT 0 %  0-1   NEUTROPHILS ABSOLUTE COUNT 2 71 Thousands/? ??L  1 85-7 62   LYMPHOCYTES ABSOLUTE COUNT 1 47 Thousands/? ??L  0 60-4 47   MONOCYTES ABSOLUTE COUNT 0 43 Thousand/? ??L  0 17-1 22   EOSINOPHILS ABSOLUTE COUNT 0 16 Thousand/? ??L  0 00-0 61   BASOPHILS ABSOLUTE COUNT 0 01 Thousands/? ??L  0 00-0 10     (1) COMPREHENSIVE METABOLIC PANEL [id number] 89:53GH Michelle Garsia Order Number: [id number]     Test Name Result Flag Reference   GLUCOSE,RANDM 173 mg/dL H    If the patient is fasting, the ADA then defines impaired fasting glucose as > 100 mg/dL and diabetes as > or equal to 123 mg/dL  Specimen collection should occur prior to Sulfasalazine administration due to the potential for falsely depressed results  Specimen collection should occur prior to Sulfapyridine administration due to the potential for falsely elevated results     SODIUM 141 mmol/L  136-145   POTASSIUM 3 4 mmol/L L 3 5-5 3   CHLORIDE 106 mmol/L  100-108   CARBON DIOXIDE 30 mmol/L  21-32   ANION GAP (CALC) 5 mmol/L  4-13   BLOOD UREA NITROGEN 12 mg/dL  5-25   CREATININE 0 56 mg/dL L 0 60-1 30   Standardized to IDMS reference method   CALCIUM 8 7 mg/dL  8 3-10 1   BILI, TOTAL 0 35 mg/dL  0 20-1 00   ALK PHOSPHATAS 111 U/L     ALT (SGPT) 23 U/L  12-78   Specimen collection should occur prior to Sulfasalazine administration due to the potential for falsely depressed results  AST(SGOT) 17 U/L  5-45   Specimen collection should occur prior to Sulfasalazine administration due to the potential for falsely depressed results  ALBUMIN 3 7 g/dL  3 5-5 0   TOTAL PROTEIN 7 0 g/dL  6 4-8 2   eGFR 103 ml/min/1 73sq m     [facility] Disease Education Program recommendations are as follows:  GFR calculation is accurate only with a steady state creatinine  Chronic Kidney disease less than 60 ml/min/1 73 sq  meters  Kidney failure less than 15 ml/min/1 73 sq  meters       (1) AMYLASE [id number] 04:59PM [facility] Order Number: [id number]     Test Name Result Flag Reference   AMYLASE 24 IU/L L      (1) LIPASE [id number] 04:59PM [facility] Order Number: [id number]     Test Name Result Flag Reference   LIPASE 196 u/L

## 2018-01-22 VITALS
BODY MASS INDEX: 25.1 KG/M2 | WEIGHT: 136.38 LBS | DIASTOLIC BLOOD PRESSURE: 80 MMHG | HEART RATE: 79 BPM | HEIGHT: 62 IN | SYSTOLIC BLOOD PRESSURE: 134 MMHG

## 2018-01-22 VITALS
HEART RATE: 72 BPM | WEIGHT: 134.25 LBS | DIASTOLIC BLOOD PRESSURE: 70 MMHG | SYSTOLIC BLOOD PRESSURE: 130 MMHG | HEIGHT: 61 IN | BODY MASS INDEX: 25.34 KG/M2

## 2018-01-23 NOTE — PROGRESS NOTES
Assessment    1  Right wrist pain (739 43) (M25 531)   2  De Quervain's tenosynovitis (727 04) (M65 4)    Plan  De Quervain's tenosynovitis    · Betamethasone Sod Phos & Acet 6 (3-3) MG/ML Injection Suspension   · Injection Tendon Sheath Ligament Single - POC; Status:Complete;   Done: 54MPF1824  11:00AM    Discussion/Summary    Right-sided de Quervain's tenosynovitis  Trial of thumb spica splint  We have discussed several treatment options otherwise and patient opted for a steroid injection today  We had done a steroid injection which patient tolerated well  Patient will call me regarding followup depending on the progression of her symptoms  She also call me meantime with any questions or concerns  Possible side effects of new medications were reviewed with the patient/guardian today  The treatment plan was reviewed with the patient/guardian  The patient/guardian understands and agrees with the treatment plan      Chief Complaint    1  Wrist Pain    History of Present Illness  HPI: Patient is a very pleasant 64-year-old female here for evaluation of right wrist pain which started about 3 weeks ago  No trauma or fall  Patient states she has been visiting with her grandson and has been lifting in more frequently  Pain is localized to the radial aspect of the wrist over the radial styloid and radiates to the thumb and up the forearm  No numbness or tingling  She has some weakness with pinching strength but not with  strength otherwise  Review of Systems    Constitutional: No fever, no chills, feels well, no tiredness, no recent weight gain or loss  Eyes: No complaints of eyesight problems, no red eyes  ENT: no loss of hearing, no nosebleeds, no sore throat  Cardiovascular: No complaints of chest pain, no palpitations, no leg claudication or lower extremity edema  Respiratory: no compliants of shortness of breath, no wheezing, no cough     Gastrointestinal: no complaints of abdominal pain, no constipation, no nausea or diarrhea, no vomiting, no bloody stools  Genitourinary: no complaints of dysuria, no incontinence  Musculoskeletal: as noted in HPI  Integumentary: no complaints of skin rash or lesion, no itching or dry skin, no skin wounds  Neurological: no complaints of headache, no confusion, no numbness or tingling, no dizziness  Endocrine: No complaints of muscle weakness, no feelings of weakness, no frequent urination, no excessive thirst    Psychiatric: No suicidal thoughts, no anxiety, no feelings of depression  ROS reviewed  Active Problems    1  Abnormal brain scan (794 09) (R94 02)   2  Allergic rhinitis (477 9) (J30 9)   3  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)   4  Breast cancer screening (V76 10) (Z12 39)   5  Eczema (692 9) (L30 9)   6  Esophageal Reflux   7  Essential hypertension (401 9) (I10)   8  Fatigue (780 79) (R53 83)   9  Flu vaccine need (V04 81) (Z23)   10  Functional murmur (R01 0)   11  History of acute sinusitis (V12 69) (Z87 09)   12  Hypercholesterolemia (272 0) (E78 0)   13  Hyperglycemia (790 29) (R73 9)   14  Influenza vaccination administered during current admission (V04 81) (Z23)   15  Knee osteoarthritis (715 36) (M17 9)   16  Left knee pain (719 46) (M25 562)   17  Need for Tdap vaccination (V06 1) (Z23)   18  Patellofemoral arthralgia of left knee (719 46) (M25 562)   19  Stress Incontinence (788 39)   20   Tear film insufficiency, unspecified laterality (375 15) (O42 612)    Past Medical History    · History of Acute bronchitis (466 0) (J20 9)   · 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)   · History of Hepatitis, B Virus (070 30)   · History of acute sinusitis (V12 69) (Z87 09)   · History of Nocturia (788 43) (R35 1)   · History of Pregnancy History   · History of Sinusitis (473 9) (J32 9)   · History of Vertigo (780 4) (R42)   · History of Vertigo (780 4) (R42)   · History of Viral hepatitis A without coma (070 1) (B15 9)    The active problems and past medical history were reviewed and updated today  Surgical History    · History of Cataract Surgery   · History of  Section    The surgical history was reviewed and updated today  Family History  Mother    · Family history of Benign Essential Hypertension   · Family history of Diabetes Mellitus (V18 0)    The family history was reviewed and updated today  Social History    · Never A Smoker   · Never Drank Alcohol  The social history was reviewed and updated today  Current Meds   1  AmLODIPine Besylate 2 5 MG Oral Tablet; take 1 tablet by mouth once daily as directed; Therapy: 23DTO5138 to (Evaluate:2017)  Requested for: 2016; Last   Rx:2016 Ordered   2  Aspirin 81 MG TABS; 1 pill ; Therapy: (Recorded:19Iqy1090) to Recorded   3  Calcium 500+D 500-200 MG-UNIT Oral Tablet; Therapy: (Ryan Rodriguez) to Recorded   4  Fluticasone Propionate 50 MCG/ACT Nasal Suspension; instill 2 sprays into each nostril   once daily; Therapy: 39Udd6684 to (Evaluate:2017)  Requested for: 04ZCX8237; Last   Rx:2016 Ordered   5  Probiotic Complex Acidophilus Oral Capsule Recorded   6  Refresh Tears SOLN;   Therapy: (Recorded:65Vni0777) to Recorded   7  Vigamox 0 5 % Ophthalmic Solution; Therapy: (Recorded:17Ico3475) to Recorded   8  Vitamin D 1000 UNIT CAPS; take 1 capsule daily; Therapy: (Recorded:21Vqn5626) to Recorded    The medication list was reviewed and updated today  Allergies    1   Verapamil HCl TABS    Vitals  Signs    Systolic: 005  Diastolic: 78  Heart Rate: 74  Height: 5 ft 2 in  Weight: 147 lb   BMI Calculated: 26 89  BSA Calculated: 1 68    Physical Exam    Right Wrist: Appearance: Normal  Tenderness: None except the radial styloid process, abductor pollicis longus and extensor pollicis brevis tendons of the first dorsal compartment, abductor pollicis longus tendon and extensor pollicis brevis tendon, but not the carpal tunnel  Palpatory findings include no crepitus, no thumb crepitus, no decreased sensation to light touch diffusely, no decreased sensation to light touch of the median nerve distribution, no decreased sensation to light touch of the ulnar nerve distribution and no warmth  ROM: Full except as noted: Ulnar deviation: painful restricted AROM  Motor: Mildly decreased pinch strength and  strength due to pain  Radial pulse easily palpable, capillary refill less than 2 seconds in all fingers  Skin is warm and dry  Special Tests: positive Finkelstein's test, but negative Phalen's test and negative Tinel's sign at the carpal tunnel  Constitutional - General appearance: Normal    Musculoskeletal - Upper extremity compartments: Normal    Cardiovascular - Pulses: Normal  Examination of extremities for edema and/or varicosities: Normal       Lungs: Normal respiratory rate and rhythm, no wheezes, no cough, no dyspnea  Neurologic - Sensation: Normal  Upper extremity peripheral neuro exam: Normal    Psychiatric - Mood and affect: Normal    Eyes   Conjunctiva and lids: Normal        Procedure    Procedure: Injection of the First dorsal compartment tendon sheath on the right   Indication: inflammation, joint pain and diagnostic  Risk, benefits, alternatives, bleeding risk, infection risk and allergic reaction risk were discussed with the patient  Verbal consent was obtained prior to the procedure  Preparation: alcohol was used to prep the area  ethyl chloride spray was used as a topical anesthetic  Procedure Note: A 25-gauge and 1 5 inch needle was used to inject 0 5 mL of 1% Lidocaine and 1 mL of 6mg/mL betamethasone     Post-Procedure:      Signatures   Electronically signed by : Leodan Thornton MD; Aug 23 2016 10:59AM EST                       (Author)

## 2018-03-07 NOTE — PROGRESS NOTES
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)   4  De Quervain's tenosynovitis (727 04) (M65 4)   5  Eczema (692 9) (L30 9)   6  Esophageal Reflux   7  Essential hypertension (401 9) (I10)   8  Fatigue (780 79) (R53 83)   9  Functional murmur (R01 0)   10  History of acute sinusitis (V12 69) (Z87 09)   11  Hypercholesterolemia (272 0) (E78 00)   12  Hyperglycemia (790 29) (R73 9)   13  Knee osteoarthritis (715 36) (M17 9)   14  Left knee pain (719 46) (M25 562)   15  Need for revaccination (V05 9) (Z23)   16  Patellofemoral arthralgia of left knee (719 46) (M25 562)   17  Right wrist pain (719 43) (M25 531)   18  Stress Incontinence (788 39)   19  Tear film insufficiency, unspecified laterality (375 15) (H04 129)   20  Trimalleolar fracture of left ankle (824 6) (N92 473T)    Status post open reduction with internal fixation of fracture (V45 89) (Z96 7)          Immunizations  Influenza --- Radha Eddy: [id number]: 21-Jan-2016   Tdap --- Radhaalvaro Eddy: 21-Jan-2016     Current Meds   1  AmLODIPine Besylate 2 5 MG Oral Tablet; take 1 tablet by mouth once daily as directed   2  Aspirin 81 MG TABS; 1 pill Mon Weds Fri   3  Calcium 500+D 500-200 MG-UNIT Oral Tablet   4  Fluticasone Propionate 50 MCG/ACT Nasal Suspension; instill 2 sprays into each nostril   once daily   5  Probiotic Complex Acidophilus Oral Capsule   6  Refresh Tears SOLN   7  Tylenol 325 MG Oral Tablet   8  Vigamox 0 5 % Ophthalmic Solution   9   Vitamin D3 2000 UNIT Oral Capsule; take 1 capsule daily    Allergies    1  Verapamil HCl TABS    2  No Known Environmental Allergies    Plan    1   RVAC-Adacel 5-2-15 5 LF-MCG/0 5 Intramuscular Suspension    Signatures   Electronically signed by : Markus Farooq DO; Jan 25 2017 12:05PM EST                       (Author)

## 2018-04-25 RX ORDER — FLUTICASONE PROPIONATE 50 MCG
2 SPRAY, SUSPENSION (ML) NASAL DAILY
COMMUNITY
Start: 2012-02-27 | End: 2020-08-20 | Stop reason: SDUPTHER

## 2018-04-25 RX ORDER — CARBOXYMETHYLCELLULOSE SODIUM 5 MG/ML
SOLUTION/ DROPS OPHTHALMIC
COMMUNITY

## 2018-04-25 RX ORDER — LANOLIN ALCOHOL/MO/W.PET/CERES
1 CREAM (GRAM) TOPICAL
COMMUNITY
End: 2018-04-27 | Stop reason: ALTCHOICE

## 2018-04-25 RX ORDER — LOTEPREDNOL ETABONATE 5 MG/ML
SUSPENSION/ DROPS OPHTHALMIC
Refills: 0 | COMMUNITY
Start: 2018-03-05 | End: 2018-04-27 | Stop reason: ALTCHOICE

## 2018-04-25 RX ORDER — OMEPRAZOLE 40 MG/1
1 CAPSULE, DELAYED RELEASE ORAL DAILY PRN
COMMUNITY
Start: 2017-11-07 | End: 2019-04-11 | Stop reason: ALTCHOICE

## 2018-04-25 RX ORDER — TOBRAMYCIN 3 MG/ML
SOLUTION/ DROPS OPHTHALMIC
Refills: 0 | COMMUNITY
Start: 2018-03-02 | End: 2018-04-26 | Stop reason: ALTCHOICE

## 2018-04-25 RX ORDER — IBANDRONATE SODIUM 150 MG/1
1 TABLET, FILM COATED ORAL
Refills: 0 | COMMUNITY
Start: 2018-03-13 | End: 2018-05-03 | Stop reason: SDUPTHER

## 2018-04-25 RX ORDER — ACETAMINOPHEN 325 MG/1
1 TABLET ORAL AS NEEDED
COMMUNITY
Start: 2016-09-26 | End: 2019-04-11 | Stop reason: ALTCHOICE

## 2018-04-25 RX ORDER — NEOMYCIN SULFATE, POLYMYXIN B SULFATE, AND DEXAMETHASONE 3.5; 10000; 1 MG/G; [USP'U]/G; MG/G
OINTMENT OPHTHALMIC
Refills: 0 | COMMUNITY
Start: 2018-03-02 | End: 2018-04-26 | Stop reason: ALTCHOICE

## 2018-04-26 PROBLEM — K29.00 ACUTE GASTRITIS: Status: ACTIVE | Noted: 2017-11-07

## 2018-04-26 PROBLEM — R10.13 ABDOMINAL PAIN, EPIGASTRIC: Status: ACTIVE | Noted: 2017-11-03

## 2018-04-26 PROBLEM — M54.6 RIGHT-SIDED THORACIC BACK PAIN: Status: ACTIVE | Noted: 2017-11-07

## 2018-04-26 PROBLEM — M85.80 OSTEOPENIA: Status: ACTIVE | Noted: 2017-10-22

## 2018-04-26 RX ORDER — BEPOTASTINE BESILATE 15 MG/ML
SOLUTION/ DROPS OPHTHALMIC
Refills: 0 | COMMUNITY
Start: 2018-04-24

## 2018-04-27 ENCOUNTER — OFFICE VISIT (OUTPATIENT)
Dept: FAMILY MEDICINE CLINIC | Facility: CLINIC | Age: 58
End: 2018-04-27
Payer: COMMERCIAL

## 2018-04-27 VITALS
SYSTOLIC BLOOD PRESSURE: 134 MMHG | WEIGHT: 133 LBS | OXYGEN SATURATION: 95 % | DIASTOLIC BLOOD PRESSURE: 82 MMHG | BODY MASS INDEX: 23.57 KG/M2 | HEIGHT: 63 IN | HEART RATE: 83 BPM

## 2018-04-27 DIAGNOSIS — E55.9 VITAMIN D DEFICIENCY: ICD-10-CM

## 2018-04-27 DIAGNOSIS — D12.6 BENIGN NEOPLASM OF COLON, UNSPECIFIED PART OF COLON: ICD-10-CM

## 2018-04-27 DIAGNOSIS — I10 ESSENTIAL HYPERTENSION: ICD-10-CM

## 2018-04-27 DIAGNOSIS — E78.00 HYPERCHOLESTEROLEMIA: ICD-10-CM

## 2018-04-27 DIAGNOSIS — Z87.81 HISTORY OF FRACTURE: ICD-10-CM

## 2018-04-27 DIAGNOSIS — R73.9 HYPERGLYCEMIA: ICD-10-CM

## 2018-04-27 DIAGNOSIS — K21.9 GASTROESOPHAGEAL REFLUX DISEASE WITHOUT ESOPHAGITIS: ICD-10-CM

## 2018-04-27 DIAGNOSIS — G93.89 CEREBRAL GLIOSIS: ICD-10-CM

## 2018-04-27 DIAGNOSIS — M85.80 OSTEOPENIA, UNSPECIFIED LOCATION: ICD-10-CM

## 2018-04-27 DIAGNOSIS — Z00.00 WELL ADULT HEALTH CHECK: Primary | ICD-10-CM

## 2018-04-27 DIAGNOSIS — Z11.59 ENCOUNTER FOR HEPATITIS C SCREENING TEST FOR LOW RISK PATIENT: ICD-10-CM

## 2018-04-27 PROBLEM — M54.6 RIGHT-SIDED THORACIC BACK PAIN: Status: RESOLVED | Noted: 2017-11-07 | Resolved: 2018-04-27

## 2018-04-27 PROBLEM — Z96.642 H/O TOTAL HIP ARTHROPLASTY, LEFT: Status: ACTIVE | Noted: 2018-04-27

## 2018-04-27 PROBLEM — R10.13 ABDOMINAL PAIN, EPIGASTRIC: Status: RESOLVED | Noted: 2017-11-03 | Resolved: 2018-04-27

## 2018-04-27 PROBLEM — K63.5 COLON POLYPS: Status: ACTIVE | Noted: 2018-04-27

## 2018-04-27 PROCEDURE — 99396 PREV VISIT EST AGE 40-64: CPT | Performed by: FAMILY MEDICINE

## 2018-04-27 RX ORDER — ESTRADIOL 0.1 MG/G
CREAM VAGINAL AS NEEDED
COMMUNITY
Start: 2017-10-11

## 2018-04-27 NOTE — PROGRESS NOTES
FAMILY PRACTICE OFFICE VISIT  Paulo LYNCH O  [address]  [address], [facility]      NAME: Javon Mcclelland  AGE: 59 y o  SEX: female  : 1960   MRN: [id number]    DATE: 2018  TIME: 11:27 AM    Assessment and Plan     Problem List Items Addressed This Visit        Digestive    Benign neoplasm of large intestine    Relevant Orders    Ambulatory referral to Gastroenterology    Esophageal reflux    Relevant Orders    Ambulatory referral to Gastroenterology    CBC       Cardiovascular and Mediastinum    Essential hypertension       Musculoskeletal and Integument    Osteopenia    Relevant Orders    HEMOGLOBIN A1C W/ EAG ESTIMATION       Other    Cerebral gliosis    Hypercholesterolemia    Relevant Orders    Comprehensive metabolic panel    Hyperglycemia    Relevant Orders    Comprehensive metabolic panel    Lipid panel      Other Visit Diagnoses     Well adult health check    -  Primary    History of fracture        Relevant Orders    HEMOGLOBIN A1C W/ EAG ESTIMATION    Vitamin D deficiency        Relevant Orders    Vitamin D 25 hydroxy    Encounter for hepatitis C screening test for low risk patient        Relevant Orders    Hepatitis C antibody          Patient Instructions     She is in today for a routine physical/ check up     She looks well here today    As before gliosis was seen on MRI/MRA of head,  and , no new symptomatology  Stay on Aspirin  Await redo blood work, redo lipid panel  Consider statin  Stay w BP med as is  Long-standing occ left periorbital pressure-  Watch sinuses also -  See no need redo imaging at present      Past history anemia, she is off iron, await redo CBC     Immunization History   Administered Date(s) Administered    Influenza Quadrivalent Preservative Free 3 years and older IM 2014, 2017    Influenza Quadrivalent, 6-35 Months IM 2016    Tdap 2016, 01/24/2017       She does do yearly Flu shot although she missed this past season -   Should do in Oct/ Nov   Tdap/tetanus shot is up to date  (done every 10 yrs for superficial cuts, every 5 yrs for deep wounds)   Can also look into coverage for new shingles shot, Shingrix (indicated if over 48years of age ) Can do that at pharmacy  Was never a smoker     Regarding Colon Cancer screening, we discussed Colonoscopy  is indicated starting at age 48  Screening is up to date re Colonoscopy 2013 w polyp via Dr Bernard Higgins-     she does have gastroesophageal reflux along with epigastric pain, she has not yet set up with [facility], I put in another consult, I would like her to see them to discuss redo colonoscopy along with EGD  In the meantime she should avoid ibuprofen/ Motrin /Advil  She can use her aspirin 81 mg 3 times weekly regarding prior history gliosis seen on MRI  Await redo blood work    She does see her Gynecologist routinely  Saw Dr Autumn Blood Oct 2017  up-to-date  Discussed screening Mammogram, this is up-to-date   She did have DEXA scan October 2017, this showed osteopenia with borderline osteoporosis femoral neck at-2 4, lumbar-2 2  She should continue with Boniva and redo DEXA scan October 2019  Do weight-bearing exercise as tolerated, she does have past history left ankle fracture,  She did have arthroplasty left hip June 2017 after hip fracture  Hepatis C Screening indicated for 'baby boomers' -  after discussion she will do Hepatitis C screen  low risk    We did review previous blood work, last [facility] January 2016   3 95  She is not up to date with Lipid screening  ordered   She is up to date with Diabetes screening  Await A1c  Discussed importance of routine exercise, healthy diet       Also - should see Dentist routinely, and also should see Eye Dr if any vision changes      We will see her again in 6 months, sooner as needed          Chief Complaint     Chief Complaint Patient presents with    Physical Exam       History of Present Illness   Luna Rubinstein is a 59y o -year-old female who is in today for a routine checkup/6 month follow-up  Since I saw her in November she is feeling about the same as baseline  Her epigastric pain is improved but she does experience it at time, she never did see Gastroenterology  She has had no worrisome symptoms such as weight loss, melena or increased pain  She is working part-time, tries to stay active  As before she did have previous fracture ankle, left hip, had undergone left hip arthroplasty June 2017 at [facility]  She does get occasional left periorbital pressure, she has had this for many years, previous imaging had shown areas gliosis, no increased sinus symptoms, no fever, no vision change  She does see her eye doctor routinely  She does check her sugars at home, she relates these run slightly elevated in the morning  She does try to watch healthy diet  Last A1c 2017 was 6 0    She did see her gynecologist in the fall  She is utilizing Boniva along with vitamin-D    Past history anemia, she remains off iron, no increased fatigue    Review of Systems   Review of Systems   Constitutional: Negative for appetite change, fatigue, fever and unexpected weight change  See HPI   HENT: Negative for congestion, facial swelling, mouth sores, sore throat and trouble swallowing  Eyes: Negative for visual disturbance  Respiratory: Negative for cough, chest tightness, shortness of breath and wheezing  Cardiovascular: Negative for chest pain, palpitations and leg swelling  Gastrointestinal: Positive for abdominal pain (Occasional epigastric, improved versus prior)  Negative for abdominal distention, blood in stool, nausea and vomiting  No change in bowel   Genitourinary: Negative for dysuria and hematuria  Musculoskeletal: Positive for arthralgias  Skin: Negative for wound     Neurological: Negative for dizziness, seizures, syncope, facial asymmetry, speech difficulty, weakness, light-headedness and headaches  Hematological: Does not bruise/bleed easily  Psychiatric/Behavioral: Negative for behavioral problems and sleep disturbance  The patient is not nervous/anxious  Active Problem List     Patient Active Problem List   Diagnosis    Closed trimalleolar fracture of left ankle    Cerebral gliosis    Acute gastritis    Allergic rhinitis    Benign neoplasm of large intestine    De Quervain's tenosynovitis    Eczema    Esophageal reflux    Essential hypertension    Functional murmur    Hypercholesterolemia    Hyperglycemia    Knee osteoarthritis    Osteopenia    Right wrist pain    Urge urinary incontinence    Tear film insufficiency    Colon polyps    H/O total hip arthroplasty, left       Past Medical History:  Past Medical History:   Diagnosis Date    Fistula-in-ano     Hepatitis A     Hepatitis B virus infection     Hypertension     Osteoarthritis     Postoperative anemia     last assessed 17    Vertigo     Viral hepatitis A without coma        Past Surgical History:  Past Surgical History:   Procedure Laterality Date    CATARACT EXTRACTION           SECTION      HIP SURGERY Left     ORIF re fx     LIPOMA RESECTION      b/l armpits     ORIF TIBIA & FIBULA FRACTURES Left 2016    Procedure: OPEN REDUCTION W/ INTERNAL FIXATION (ORIF) ANKLE;  Surgeon: Ehsan Patterson DO;  Location: [facility] OR;  Service:        Family History:  Family History   Problem Relation Age of Onset    Hypertension Mother      benign essential    Diabetes Mother        Social History:  Social History     Social History    Marital status:      Spouse name: N/A    Number of children: N/A    Years of education: N/A     Occupational History    Not on file       Social History Main Topics    Smoking status: Never Smoker    Smokeless tobacco: Never Used    Alcohol use No    Drug use: No    Sexual activity: Not on file     Other Topics Concern    Not on file     Social History Narrative    No narrative on file       Objective     Vitals:    04/27/18 1022   BP: 134/82   Pulse: 83   SpO2: 95%   Weight: 60 3 kg (133 lb)   Height: 5' 2 5" (1 588 m)     Body mass index is 23 94 kg/m²  BP Readings from Last 3 Encounters:   04/27/18 134/82   11/07/17 130/70   11/03/17 118/70       Wt Readings from Last 3 Encounters:   04/27/18 60 3 kg (133 lb)   11/07/17 60 9 kg (134 lb 4 oz)   11/03/17 61 5 kg (135 lb 8 oz)       Physical Exam   Constitutional: She is oriented to person, place, and time  She appears well-developed and well-nourished  No distress  Very pleasant 55-year-old female seated no acute distress   HENT:   Mouth/Throat: Oropharynx is clear and moist  No oropharyngeal exudate  TM clear   Eyes: Conjunctivae and EOM are normal  Pupils are equal, round, and reactive to light  No scleral icterus  Neck: Normal range of motion  Neck supple  No thyromegaly present  Cardiovascular: Normal rate, regular rhythm and normal heart sounds  No murmur heard  No carotid bruit   Pulmonary/Chest: Effort normal and breath sounds normal  No respiratory distress  Abdominal: Soft  There is no tenderness  Musculoskeletal: She exhibits no edema  Lymphadenopathy:     She has no cervical adenopathy  Neurological: She is alert and oriented to person, place, and time  No cranial nerve deficit  She exhibits normal muscle tone  Coordination normal    Psychiatric: She has a normal mood and affect   Her behavior is normal        ALLERGIES:  Allergies   Allergen Reactions    Norco [Hydrocodone-Acetaminophen] GI Intolerance     "VERY NAUSEATED"    Verapamil GI Intolerance       Current Medications     Current Outpatient Prescriptions   Medication Sig Dispense Refill    amLODIPine (NORVASC) 2 5 mg tablet Take 2 5 mg by mouth daily      aspirin 81 MG tablet Take 1 tablet by mouth 3 (three) times a week       Calcium Carbonate-Vit D-Min (CALCIUM 1200 PO) Take 600 mg by mouth 2 (two) times a day        carboxymethylcellulose (REFRESH TEARS) 0 5 % SOLN Apply to eye      Chlorpheniramine-Phenylephrine (SINUS & ALLERGY PO) Take by mouth as needed      estradiol (ESTRACE) 0 1 mg/g vaginal cream as needed      fluticasone (FLONASE) 50 mcg/act nasal spray 2 sprays into each nostril daily      ibandronate (BONIVA) 150 MG tablet 1 tablet every 28 days  on empty stomach   0    omeprazole (PriLOSEC) 40 MG capsule Take 1 capsule by mouth daily      Vitamin D, Cholecalciferol, 1000 UNITS CAPS Take 1,000 Units by mouth 2 (two) times a day        acetaminophen (TYLENOL) 325 mg tablet Take 1 tablet by mouth as needed      BEPREVE 1 5 % op soln place 1 drop into both eyes twice a day  0     No current facility-administered medications for this visit                Most recent labs available from [address] [place] [address]   ( others may be available in [facility] / Media sections)  Lab Results   Component Value Date    WBC 4 78 11/03/2017    HGB 13 2 11/03/2017    HCT 39 1 11/03/2017     11/03/2017    ALT 23 11/03/2017    AST 17 11/03/2017     11/03/2017    K 3 4 (L) 11/03/2017     11/03/2017    CREATININE 0 56 (L) 11/03/2017    BUN 12 11/03/2017    CO2 30 11/03/2017    HGBA1C 6 0 11/07/2017     No results found for: LDLCALC  No results found for: TSH      Orders Placed This Encounter   Procedures    Hm Mammography    CBC    Comprehensive metabolic panel    Lipid panel    HEMOGLOBIN A1C W/ EAG ESTIMATION    Vitamin D 25 hydroxy    Hepatitis C antibody    Ambulatory referral to Gastroenterology         Sarahi Dozier DO

## 2018-04-27 NOTE — PATIENT INSTRUCTIONS
She is in today for a routine physical/ check up     She looks well here today    As before gliosis was seen on MRI/MRA of head,  and , no new symptomatology  Stay on Aspirin  Await redo blood work, redo lipid panel  Consider statin  Stay w BP med as is  Long-standing occ left periorbital pressure-  Watch sinuses also -  See no need redo imaging at present  Past history anemia, she is off iron, await redo CBC     Immunization History   Administered Date(s) Administered    Influenza Quadrivalent Preservative Free 3 years and older IM 2014, 2017    Influenza Quadrivalent, 6-35 Months IM 2016    Tdap 2016, 2017       She does do yearly Flu shot although she missed this past season -   Should do in Oct/ Nov   Tdap/tetanus shot is up to date  (done every 10 yrs for superficial cuts, every 5 yrs for deep wounds)   Can also look into coverage for new shingles shot, Shingrix (indicated if over 48years of age ) Can do that at pharmacy  Was never a smoker     Regarding Colon Cancer screening, we discussed Colonoscopy  is indicated starting at age 48  Screening is up to date re Colonoscopy  w polyp via Dr Perla Issa-     she does have gastroesophageal reflux along with epigastric pain, she has not yet set up with [facility], I put in another consult, I would like her to see them to discuss redo colonoscopy along with EGD  In the meantime she should avoid ibuprofen/ Motrin /Advil  She can use her aspirin 81 mg 3 times weekly regarding prior history gliosis seen on MRI  Await redo blood work    She does see her Gynecologist routinely  Saw Dr Oneal Perez Oct 2017  up-to-date  Discussed screening Mammogram, this is up-to-date   She did have DEXA scan 2017, this showed osteopenia with borderline osteoporosis femoral neck at-2 4, lumbar-2 2  She should continue with Boniva and redo DEXA scan 2019    Do weight-bearing exercise as tolerated, she does have past history left ankle fracture,  She did have arthroplasty left hip June 2017 after hip fracture  Hepatis C Screening indicated for 'baby boomers' -  after discussion she will do Hepatitis C screen  low risk    We did review previous blood work, last [facility] January 2016   3 95  She is not up to date with Lipid screening  ordered   She is up to date with Diabetes screening  Await A1c  Discussed importance of routine exercise, healthy diet       Also - should see Dentist routinely, and also should see Eye Dr if any vision changes      We will see her again in 6 months, sooner as needed

## 2018-05-03 DIAGNOSIS — M85.80 OSTEOPENIA, UNSPECIFIED LOCATION: Primary | ICD-10-CM

## 2018-05-03 RX ORDER — IBANDRONATE SODIUM 150 MG/1
TABLET, FILM COATED ORAL
Qty: 1 TABLET | Refills: 6 | Status: SHIPPED | OUTPATIENT
Start: 2018-05-03 | End: 2019-04-11 | Stop reason: SDUPTHER

## 2019-01-01 DIAGNOSIS — I10 ESSENTIAL HYPERTENSION: Primary | ICD-10-CM

## 2019-01-01 RX ORDER — AMLODIPINE BESYLATE 2.5 MG/1
TABLET ORAL
Qty: 90 TABLET | Refills: 3 | Status: SHIPPED | OUTPATIENT
Start: 2019-01-01 | End: 2020-01-13

## 2019-04-11 ENCOUNTER — OFFICE VISIT (OUTPATIENT)
Dept: FAMILY MEDICINE CLINIC | Facility: CLINIC | Age: 59
End: 2019-04-11
Payer: COMMERCIAL

## 2019-04-11 VITALS
DIASTOLIC BLOOD PRESSURE: 68 MMHG | OXYGEN SATURATION: 95 % | HEIGHT: 63 IN | BODY MASS INDEX: 24.13 KG/M2 | HEART RATE: 84 BPM | SYSTOLIC BLOOD PRESSURE: 130 MMHG | WEIGHT: 136.2 LBS

## 2019-04-11 DIAGNOSIS — I10 ESSENTIAL HYPERTENSION: ICD-10-CM

## 2019-04-11 DIAGNOSIS — G93.89 CEREBRAL GLIOSIS: ICD-10-CM

## 2019-04-11 DIAGNOSIS — K63.5 POLYP OF COLON, UNSPECIFIED PART OF COLON, UNSPECIFIED TYPE: ICD-10-CM

## 2019-04-11 DIAGNOSIS — M85.80 OSTEOPENIA, UNSPECIFIED LOCATION: ICD-10-CM

## 2019-04-11 DIAGNOSIS — R73.9 HYPERGLYCEMIA: ICD-10-CM

## 2019-04-11 DIAGNOSIS — Z00.00 WELL ADULT HEALTH CHECK: Primary | ICD-10-CM

## 2019-04-11 DIAGNOSIS — Z11.59 ENCOUNTER FOR HEPATITIS C SCREENING TEST FOR LOW RISK PATIENT: ICD-10-CM

## 2019-04-11 DIAGNOSIS — M76.61 ACHILLES TENDINITIS OF RIGHT LOWER EXTREMITY: ICD-10-CM

## 2019-04-11 PROBLEM — K29.00 ACUTE GASTRITIS: Status: RESOLVED | Noted: 2017-11-07 | Resolved: 2019-04-11

## 2019-04-11 PROBLEM — R01.1 HEART MURMUR, SYSTOLIC: Status: ACTIVE | Noted: 2019-04-11

## 2019-04-11 PROCEDURE — 99396 PREV VISIT EST AGE 40-64: CPT | Performed by: FAMILY MEDICINE

## 2019-04-11 RX ORDER — IBUPROFEN 200 MG
200 TABLET ORAL EVERY 6 HOURS PRN
COMMUNITY
End: 2020-07-06 | Stop reason: ALTCHOICE

## 2019-04-11 RX ORDER — IBANDRONATE SODIUM 150 MG/1
150 TABLET, FILM COATED ORAL
Qty: 1 TABLET | Refills: 6 | Status: SHIPPED | OUTPATIENT
Start: 2019-04-11 | End: 2020-03-23

## 2020-01-13 DIAGNOSIS — I10 ESSENTIAL HYPERTENSION: ICD-10-CM

## 2020-01-13 RX ORDER — AMLODIPINE BESYLATE 2.5 MG/1
TABLET ORAL
Qty: 90 TABLET | Refills: 1 | Status: SHIPPED | OUTPATIENT
Start: 2020-01-13 | End: 2020-04-19

## 2020-03-22 DIAGNOSIS — M85.80 OSTEOPENIA, UNSPECIFIED LOCATION: ICD-10-CM

## 2020-03-23 RX ORDER — IBANDRONATE SODIUM 150 MG/1
TABLET, FILM COATED ORAL
Qty: 1 TABLET | Refills: 6 | Status: SHIPPED | OUTPATIENT
Start: 2020-03-23 | End: 2020-07-06 | Stop reason: SDUPTHER

## 2020-03-24 ENCOUNTER — TELEPHONE (OUTPATIENT)
Dept: ADMINISTRATIVE | Facility: OTHER | Age: 60
End: 2020-03-24

## 2020-03-24 DIAGNOSIS — M85.80 OSTEOPENIA, UNSPECIFIED LOCATION: ICD-10-CM

## 2020-03-24 RX ORDER — IBANDRONATE SODIUM 150 MG/1
150 TABLET, FILM COATED ORAL
Qty: 1 TABLET | Refills: 6 | Status: CANCELLED | OUTPATIENT
Start: 2020-03-24

## 2020-03-24 NOTE — TELEPHONE ENCOUNTER
----- Message from Natasha Peck, [address] sent at 3/24/2020  1:26 PM EDT -----  Regarding: PAP exam/ [facility]  03/24/20 1:26 PM    Hello, our patient Tristin has had Pap Smear (HPV) aka Cervical Cancer Screening completed/performed  Please assist in updating the patient chart by pulling the Care Everywhere (CE) document  The date of service is 10/11/2017       Thank you,  Natasha Peck, [address]

## 2020-03-24 NOTE — TELEPHONE ENCOUNTER
Upon review of the In Basket request we were able to locate, review, and update the patient chart as requested for Pap Smear (HPV) aka Cervical Cancer Screening  Any additional questions or concerns should be emailed to the Practice Liaisons via [email or web address] email, please do not reply via In Basket      Thank you  Melinda Blas MA

## 2020-03-24 NOTE — TELEPHONE ENCOUNTER
Pt needs refill on ibandronate 150 1 tab once a month  Pt uses [facility]

## 2020-04-18 DIAGNOSIS — I10 ESSENTIAL HYPERTENSION: ICD-10-CM

## 2020-04-19 RX ORDER — AMLODIPINE BESYLATE 2.5 MG/1
TABLET ORAL
Qty: 90 TABLET | Refills: 0 | Status: SHIPPED | OUTPATIENT
Start: 2020-04-19 | End: 2020-07-06 | Stop reason: SDUPTHER

## 2020-07-06 ENCOUNTER — OFFICE VISIT (OUTPATIENT)
Dept: FAMILY MEDICINE CLINIC | Facility: CLINIC | Age: 60
End: 2020-07-06
Payer: COMMERCIAL

## 2020-07-06 VITALS
TEMPERATURE: 98.2 F | HEART RATE: 72 BPM | SYSTOLIC BLOOD PRESSURE: 130 MMHG | BODY MASS INDEX: 25.03 KG/M2 | WEIGHT: 136 LBS | HEIGHT: 62 IN | RESPIRATION RATE: 14 BRPM | DIASTOLIC BLOOD PRESSURE: 72 MMHG

## 2020-07-06 DIAGNOSIS — M25.551 RIGHT HIP PAIN: ICD-10-CM

## 2020-07-06 DIAGNOSIS — G93.89 CEREBRAL GLIOSIS: ICD-10-CM

## 2020-07-06 DIAGNOSIS — Z11.59 ENCOUNTER FOR HEPATITIS C SCREENING TEST FOR LOW RISK PATIENT: ICD-10-CM

## 2020-07-06 DIAGNOSIS — Z00.00 WELL ADULT HEALTH CHECK: Primary | ICD-10-CM

## 2020-07-06 DIAGNOSIS — M85.80 OSTEOPENIA, UNSPECIFIED LOCATION: ICD-10-CM

## 2020-07-06 DIAGNOSIS — K63.5 POLYP OF COLON, UNSPECIFIED PART OF COLON, UNSPECIFIED TYPE: ICD-10-CM

## 2020-07-06 DIAGNOSIS — R73.9 HYPERGLYCEMIA: ICD-10-CM

## 2020-07-06 DIAGNOSIS — I10 ESSENTIAL HYPERTENSION: ICD-10-CM

## 2020-07-06 PROCEDURE — 3008F BODY MASS INDEX DOCD: CPT | Performed by: FAMILY MEDICINE

## 2020-07-06 PROCEDURE — 99396 PREV VISIT EST AGE 40-64: CPT | Performed by: FAMILY MEDICINE

## 2020-07-06 RX ORDER — AMLODIPINE BESYLATE 2.5 MG/1
2.5 TABLET ORAL DAILY
Qty: 90 TABLET | Refills: 3 | Status: SHIPPED | OUTPATIENT
Start: 2020-07-06 | End: 2021-06-29

## 2020-07-06 RX ORDER — LORATADINE 10 MG/1
10 TABLET ORAL DAILY PRN
COMMUNITY

## 2020-07-06 RX ORDER — MELOXICAM 15 MG/1
15 TABLET ORAL DAILY
Qty: 15 TABLET | Refills: 0 | Status: SHIPPED | OUTPATIENT
Start: 2020-07-06 | End: 2020-08-20 | Stop reason: ALTCHOICE

## 2020-07-06 RX ORDER — IBANDRONATE SODIUM 150 MG/1
150 TABLET, FILM COATED ORAL
Qty: 3 TABLET | Refills: 3 | Status: SHIPPED | OUTPATIENT
Start: 2020-07-06 | End: 2021-01-26 | Stop reason: ALTCHOICE

## 2020-07-06 NOTE — PROGRESS NOTES
BMI Counseling: Body mass index is 24 87 kg/m²  The BMI is above normal  Nutrition recommendations include encouraging healthy choices of fruits and vegetables  Depression Screening and Follow-up Plan: Patient's depression screening was positive with a PHQ-2 score of 0  Clincally patient does not have depression  No treatment is required  FAMILY PRACTICE OFFICE VISIT  [facility]  [address] [place] [address]  [place], [address]      NAME: Key Edge  AGE: 62 y o  SEX: female  : 1960   MRN: [id number]    DATE: 2020  TIME: 3:14 PM    Assessment and Plan     Problem List Items Addressed This Visit        Digestive    Colon polyps    Relevant Orders    Ambulatory referral for colonoscopy       Cardiovascular and Mediastinum    Essential hypertension    Relevant Medications    amLODIPine (NORVASC) 2 5 mg tablet    Other Relevant Orders    Comprehensive metabolic panel       Musculoskeletal and Integument    Osteopenia    Relevant Medications    ibandronate (BONIVA) 150 MG tablet    Other Relevant Orders    Comprehensive metabolic panel    CBC    Vitamin D 25 hydroxy       Other    Cerebral gliosis    Relevant Orders    Comprehensive metabolic panel    CBC    Lipid panel    Hyperglycemia    Relevant Orders    Comprehensive metabolic panel    Hemoglobin A1C      Other Visit Diagnoses     Well adult health check    -  Primary    Right hip pain        Relevant Medications    meloxicam (MOBIC) 15 mg tablet    Other Relevant Orders    Ambulatory referral to Orthopedic Surgery    Encounter for hepatitis C screening test for low risk patient        Relevant Orders    Hepatitis C antibody          Patient Instructions     We reviewed health history along with medication  Slip given to do fasting blood work,   Does have a history of borderline hyperglycemia, home sugar readings run around 105, include A1c with blood work    Continue to work at [email or web address]  Also do fasting lipids, history gliosis on MRI/MRA back in 2011, 2013  She can continue on aspirin 81 mg, has been using that every day  Blood pressure acceptable, stay on amlodipine 2 5 mg daily  She does have past history fracture left hip, surgery left ankle  She does have 3 weeks atraumatic pain right hip, can use Meloxicam 15 mg once daily as needed, use with food  Can re-evaluate with her hip surgeon, [facility]  Also can use Tylenol as needed  Watch for increased pain assoc w Boniva use ? Immunization History   Administered Date(s) Administered    Influenza Quadrivalent Preservative Free 3 years and older IM 11/06/2014, 01/24/2017    Influenza Quadrivalent, 6-35 Months IM 01/21/2016    Tdap 01/21/2016, 01/24/2017       She does do yearly Flu shot  Tdap/tetanus shot is up to date  (done every 10 yrs for superficial cuts, every 5 yrs for deep wounds)   Can also look into coverage for new shingles shot, Shingrix  Can do that at pharmacy  Was never a smoker     Regarding Colon Cancer screening, she had polyps on colonoscopy in 2013-   Dr Karol Mitchell -  Referral placed to redo colonoscopy  She does see her Gynecologist ( Dr Aleksandr Lou) routinely  Is on Estrace Vag Crm -  Can re-eval w her re urinary freq  Discussed screening Mammogram, this is  ordered by Gyn  She also has order from them to redo DEXA scan -   Is on Boniva monthly  Hepatitis C Screening indicated for 'baby boomers' -  after discussion she will do Hepatitis C screen  low risk  Continue to try to watch healthy diet, exercise routinely  We will see her again in 12 months, sooner as needed  Chief Complaint     No chief complaint on file  History of Present Illness   Chema Gaytan is a 62y o -year-old female who is in today for a yearly check, she relates overall she has been feeling well other than atraumatic right hip pain for the past 2 to 3 weeks    Did have some benefit with Tylenol  Feels using Boniva at the beginning of the month may aggravate it  Did have remote replacement left hip, fracture left ankle    She does see gyn  Review of Systems   Review of Systems   Constitutional: Negative for appetite change, fatigue, fever and unexpected weight change  HENT: Negative for sore throat and trouble swallowing  Respiratory: Negative for cough, chest tightness and shortness of breath  Cardiovascular: Negative for chest pain, palpitations and leg swelling  Gastrointestinal: Negative for abdominal pain, blood in stool, nausea and vomiting  No acid reflux     No change in bowel   Genitourinary: Negative for dysuria and hematuria  Musculoskeletal: Positive for arthralgias  Negative for back pain  Neurological: Negative for dizziness, syncope, light-headedness and headaches  Psychiatric/Behavioral: Negative for behavioral problems and confusion  Active Problem List     Patient Active Problem List   Diagnosis    Closed trimalleolar fracture of left ankle    Cerebral gliosis    Allergic rhinitis    De Quervain's tenosynovitis    Eczema    Esophageal reflux    Essential hypertension    Functional murmur    Hypercholesterolemia    Hyperglycemia    Knee osteoarthritis    Osteopenia    Urge urinary incontinence    Colon polyps    H/O total hip arthroplasty, left    Heart murmur, systolic       Past Medical History:  Reviewed    Past Surgical History:  Reviewed    Family History:  Reviewed    Social History:  Reviewed    Objective     Vitals:    07/06/20 1347   BP: 130/72   Pulse: 72   Resp: 14   Temp: 98 2 °F (36 8 °C)   Weight: 61 7 kg (136 lb)   Height: 5' 2" (1 575 m)     Body mass index is 24 87 kg/m²      BP Readings from Last 3 Encounters:   07/06/20 130/72   04/11/19 130/68   04/27/18 134/82       Wt Readings from Last 3 Encounters:   07/06/20 61 7 kg (136 lb)   04/11/19 61 8 kg (136 lb 3 2 oz)   04/27/18 60 3 kg (133 lb)       Physical Exam   Constitutional: She is oriented to person, place, and time  She appears well-developed and well-nourished  No distress  Pleasant 59-year-old female   HENT:   TM clear   Eyes: No scleral icterus  Cardiovascular: Normal rate and regular rhythm  Murmur (1/6 systolic ejection murmur) heard  No carotid bruit   Pulmonary/Chest: Effort normal and breath sounds normal  No respiratory distress  Abdominal: Soft  There is no tenderness  Musculoskeletal: She exhibits no edema  Tender right femoral head, worsened with internal/external motion hip  No ankle edema  No low back tenderness  Neurological: She is alert and oriented to person, place, and time  Psychiatric: She has a normal mood and affect   Her behavior is normal        ALLERGIES:  Allergies   Allergen Reactions    Norco [Hydrocodone-Acetaminophen] GI Intolerance     "VERY NAUSEATED"    Verapamil GI Intolerance       Current Medications     Current Outpatient Medications   Medication Sig Dispense Refill    amLODIPine (NORVASC) 2 5 mg tablet Take 1 tablet (2 5 mg total) by mouth daily 90 tablet 3    aspirin 81 MG tablet Take 1 tablet by mouth daily       BEPREVE 1 5 % op soln place 1 drop into both eyes twice a day  0    Calcium Carbonate-Vit D-Min (CALCIUM 1200 PO) Take 600 mg by mouth 2 (two) times a day        carboxymethylcellulose (REFRESH TEARS) 0 5 % SOLN Apply to eye      estradiol (ESTRACE) 0 1 mg/g vaginal cream as needed      ibandronate (BONIVA) 150 MG tablet Take 1 tablet (150 mg total) by mouth every 30 (thirty) days 3 tablet 3    Multiple Vitamins-Minerals (CENTRUM SILVER 50+WOMEN) TABS Take by mouth daily      Vitamin D, Cholecalciferol, 1000 UNITS CAPS Take 1,000 Units by mouth 2 (two) times a day        fluticasone (FLONASE) 50 mcg/act nasal spray 2 sprays into each nostril daily      loratadine (CLARITIN) 10 mg tablet Take 10 mg by mouth daily as needed for allergies      meloxicam (MOBIC) 15 mg tablet Take 1 tablet (15 mg total) by mouth daily 15 tablet 0     No current facility-administered medications for this visit               Orders Placed This Encounter   Procedures    Comprehensive metabolic panel    CBC    Vitamin D 25 hydroxy    Hepatitis C antibody    Hemoglobin A1C    Lipid panel    Ambulatory referral to Orthopedic Surgery    Ambulatory referral for colonoscopy         Cristofer Luna DO

## 2020-07-06 NOTE — PATIENT INSTRUCTIONS
We reviewed health history along with medication  Slip given to do fasting blood work,   Does have a history of borderline hyperglycemia, home sugar readings run around 105, include A1c with blood work  Continue to work at [employer]  Also do fasting lipids, history gliosis on MRI/MRA back in 2011, 2013  She can continue on aspirin 81 mg, has been using that every day  Blood pressure acceptable, stay on amlodipine 2 5 mg daily  She does have past history fracture left hip, surgery left ankle  She does have 3 weeks atraumatic pain right hip, can use Meloxicam 15 mg once daily as needed, use with food  Can re-evaluate with her hip surgeon, [facility]  Also can use Tylenol as needed  Watch for increased pain assoc w Boniva use ? Immunization History   Administered Date(s) Administered    Influenza Quadrivalent Preservative Free 3 years and older IM 11/06/2014, 01/24/2017    Influenza Quadrivalent, 6-35 Months IM 01/21/2016    Tdap 01/21/2016, 01/24/2017       She does do yearly Flu shot  Tdap/tetanus shot is up to date  (done every 10 yrs for superficial cuts, every 5 yrs for deep wounds)   Can also look into coverage for new shingles shot, Shingrix  Can do that at pharmacy  Was never a smoker     Regarding Colon Cancer screening, she had polyps on colonoscopy in 2013-   Dr Maru Henderson -  Referral placed to redo colonoscopy  She does see her Gynecologist ( Dr Marine Schroeder) routinely  Is on Estrace Vag Crm -  Can re-eval w her re urinary freq  Discussed screening Mammogram, this is  ordered by Gyn  She also has order from them to redo DEXA scan -   Is on Boniva monthly  Hepatitis C Screening indicated for 'baby boomers' -  after discussion she will do Hepatitis C screen  low risk  Continue to try to watch healthy diet, exercise routinely  We will see her again in 12 months, sooner as needed

## 2020-07-08 LAB
25(OH)D3 SERPL-MCNC: 50 NG/ML (ref 30–100)
ALBUMIN SERPL-MCNC: 4.1 G/DL (ref 3.6–5.1)
ALBUMIN/GLOB SERPL: 1.6 (CALC) (ref 1–2.5)
ALP SERPL-CCNC: 63 U/L (ref 37–153)
ALT SERPL-CCNC: 14 U/L (ref 6–29)
AST SERPL-CCNC: 16 U/L (ref 10–35)
BASOPHILS # BLD AUTO: 21 CELLS/UL (ref 0–200)
BASOPHILS NFR BLD AUTO: 0.4 %
BILIRUB SERPL-MCNC: 0.5 MG/DL (ref 0.2–1.2)
BUN SERPL-MCNC: 15 MG/DL (ref 7–25)
BUN/CREAT SERPL: NORMAL (CALC) (ref 6–22)
CALCIUM SERPL-MCNC: 9.5 MG/DL (ref 8.6–10.4)
CHLORIDE SERPL-SCNC: 106 MMOL/L (ref 98–110)
CHOLEST SERPL-MCNC: 179 MG/DL
CHOLEST/HDLC SERPL: 2.9 (CALC)
CO2 SERPL-SCNC: 31 MMOL/L (ref 20–32)
CREAT SERPL-MCNC: 0.6 MG/DL (ref 0.5–0.99)
EOSINOPHIL # BLD AUTO: 228 CELLS/UL (ref 15–500)
EOSINOPHIL NFR BLD AUTO: 4.3 %
ERYTHROCYTE [DISTWIDTH] IN BLOOD BY AUTOMATED COUNT: 13.1 % (ref 11–15)
GLOBULIN SER CALC-MCNC: 2.6 G/DL (CALC) (ref 1.9–3.7)
GLUCOSE SERPL-MCNC: 96 MG/DL (ref 65–99)
HBA1C MFR BLD: 5.7 % OF TOTAL HGB
HCT VFR BLD AUTO: 39.6 % (ref 35–45)
HCV AB S/CO SERPL IA: 0.01
HCV AB SERPL QL IA: NORMAL
HDLC SERPL-MCNC: 62 MG/DL
HGB BLD-MCNC: 13.5 G/DL (ref 11.7–15.5)
LDLC SERPL CALC-MCNC: 98 MG/DL (CALC)
LYMPHOCYTES # BLD AUTO: 1346 CELLS/UL (ref 850–3900)
LYMPHOCYTES NFR BLD AUTO: 25.4 %
MCH RBC QN AUTO: 31.8 PG (ref 27–33)
MCHC RBC AUTO-ENTMCNC: 34.1 G/DL (ref 32–36)
MCV RBC AUTO: 93.4 FL (ref 80–100)
MONOCYTES # BLD AUTO: 546 CELLS/UL (ref 200–950)
MONOCYTES NFR BLD AUTO: 10.3 %
NEUTROPHILS # BLD AUTO: 3159 CELLS/UL (ref 1500–7800)
NEUTROPHILS NFR BLD AUTO: 59.6 %
NONHDLC SERPL-MCNC: 117 MG/DL (CALC)
PLATELET # BLD AUTO: 245 THOUSAND/UL (ref 140–400)
PMV BLD REES-ECKER: 10.3 FL (ref 7.5–12.5)
POTASSIUM SERPL-SCNC: 4.1 MMOL/L (ref 3.5–5.3)
PROT SERPL-MCNC: 6.7 G/DL (ref 6.1–8.1)
RBC # BLD AUTO: 4.24 MILLION/UL (ref 3.8–5.1)
SL AMB EGFR AFRICAN AMERICAN: 114 ML/MIN/1.73M2
SL AMB EGFR NON AFRICAN AMERICAN: 98 ML/MIN/1.73M2
SODIUM SERPL-SCNC: 141 MMOL/L (ref 135–146)
TRIGL SERPL-MCNC: 101 MG/DL
WBC # BLD AUTO: 5.3 THOUSAND/UL (ref 3.8–10.8)

## 2020-08-20 ENCOUNTER — OFFICE VISIT (OUTPATIENT)
Dept: FAMILY MEDICINE CLINIC | Facility: CLINIC | Age: 60
End: 2020-08-20
Payer: COMMERCIAL

## 2020-08-20 VITALS
WEIGHT: 137 LBS | BODY MASS INDEX: 25.06 KG/M2 | DIASTOLIC BLOOD PRESSURE: 80 MMHG | TEMPERATURE: 98 F | OXYGEN SATURATION: 99 % | SYSTOLIC BLOOD PRESSURE: 130 MMHG | HEART RATE: 79 BPM

## 2020-08-20 DIAGNOSIS — M54.2 CERVICAL PAIN (NECK): ICD-10-CM

## 2020-08-20 DIAGNOSIS — J30.9 ALLERGIC RHINITIS, UNSPECIFIED SEASONALITY, UNSPECIFIED TRIGGER: ICD-10-CM

## 2020-08-20 DIAGNOSIS — I10 ESSENTIAL HYPERTENSION: ICD-10-CM

## 2020-08-20 DIAGNOSIS — G93.89 CEREBRAL GLIOSIS: ICD-10-CM

## 2020-08-20 DIAGNOSIS — G44.209 ACUTE NON INTRACTABLE TENSION-TYPE HEADACHE: Primary | ICD-10-CM

## 2020-08-20 DIAGNOSIS — M85.80 OSTEOPENIA, UNSPECIFIED LOCATION: ICD-10-CM

## 2020-08-20 PROCEDURE — 99214 OFFICE O/P EST MOD 30 MIN: CPT | Performed by: FAMILY MEDICINE

## 2020-08-20 PROCEDURE — 3079F DIAST BP 80-89 MM HG: CPT | Performed by: FAMILY MEDICINE

## 2020-08-20 PROCEDURE — 3075F SYST BP GE 130 - 139MM HG: CPT | Performed by: FAMILY MEDICINE

## 2020-08-20 PROCEDURE — 1036F TOBACCO NON-USER: CPT | Performed by: FAMILY MEDICINE

## 2020-08-20 RX ORDER — METHOCARBAMOL 750 MG/1
750 TABLET, FILM COATED ORAL
Qty: 15 TABLET | Refills: 0 | Status: SHIPPED | OUTPATIENT
Start: 2020-08-20 | End: 2021-02-16 | Stop reason: ALTCHOICE

## 2020-08-20 RX ORDER — FLUTICASONE PROPIONATE 50 MCG
2 SPRAY, SUSPENSION (ML) NASAL DAILY
Qty: 1 BOTTLE | Refills: 3 | Status: SHIPPED | OUTPATIENT
Start: 2020-08-20

## 2020-08-20 NOTE — PROGRESS NOTES
FAMILY PRACTICE OFFICE VISIT  [facility]  [address], [address]      NAME: Antoinette Dutton  AGE: 62 y o  SEX: female  : 1960   MRN: [id number]    DATE: 2020  TIME: 4:54 PM    Assessment and Plan     Problem List Items Addressed This Visit        Respiratory    Allergic rhinitis    Relevant Medications    fluticasone (FLONASE) 50 mcg/act nasal spray       Cardiovascular and Mediastinum    Essential hypertension       Musculoskeletal and Integument    Osteopenia    Relevant Orders    DXA bone density spine hip and pelvis       Other    Cerebral gliosis      Other Visit Diagnoses     Acute tension-type headache -  left parietal    -  Primary    Relevant Medications    methocarbamol (ROBAXIN) 750 mg tablet    Cervical pain (neck) ( left)        Relevant Medications    methocarbamol (ROBAXIN) 750 mg tablet          Patient Instructions   Cervical tenderness on left, muscle tension type headache, she can use methocarbamol/Robaxin 750 mg every evening, muscle relaxant, as needed  Can use warm moist heat, stretch  If not improving in 1 to 2 weeks she can see chiropractor  As in recent physical note from July she does have history cerebral gliosis, had MRI/MRA back in , , I see no new finding regarding that  Her blood pressure is excellent here today  Her blood work was fine  She can use Advil cold regarding congestion as needed in the morning, also can use fluticasone  If she would note increased congestion, discolored mucus, fever she should call, no indication for antibiotic at present  She also asks that I order DEXA scan, previously had order from her gynecologist, she is on [facility]  Await result    She did see her orthopedist regarding hip pain, that is improved        Chief Complaint     Chief Complaint   Patient presents with    Headache     X 1 week on left site, taking Advil Cold helps for short time    Nasal Congestion     X 1 week       History of Present Illness   Chema Gaytan is a 62y o -year-old female who has noted past 1 to 2 weeks left-sided neck pain, cervical, no radiation into arm, no trauma  Notes left parietal headache associated with same along with some periorbital discomfort bilateral   No change in vision, no dizziness  No weakness in arm or leg  Does have some congestion, has been using Advil cold Sinus  No discolored mucus, no cough or fever  She was here in July for a regular checkup  She also asks me to order DEXA scan, she lost the slip from her gynecologist   She does continue on Boniva  * see previous MRI/MRA 2011, 2013 of head, known gliosis  Review of Systems   Review of Systems   Constitutional: Negative for appetite change, fatigue, fever and unexpected weight change  HENT: Positive for congestion  Negative for sore throat and trouble swallowing  Respiratory: Negative for cough, chest tightness and shortness of breath  Cardiovascular: Negative for chest pain, palpitations and leg swelling  Gastrointestinal: Negative for abdominal pain, blood in stool, nausea and vomiting  No acid reflux     No change in bowel   Genitourinary: Negative for dysuria and hematuria  Musculoskeletal: Positive for neck pain and neck stiffness  Neurological: Positive for headaches  Negative for dizziness, seizures, syncope, facial asymmetry, speech difficulty, weakness, light-headedness and numbness  Hematological: Does not bruise/bleed easily  Psychiatric/Behavioral: Negative for behavioral problems and confusion         Active Problem List     Patient Active Problem List   Diagnosis    Closed trimalleolar fracture of left ankle    Cerebral gliosis    Allergic rhinitis    De Quervain's tenosynovitis    Eczema    Esophageal reflux    Essential hypertension    Functional murmur    Hypercholesterolemia    Hyperglycemia    Knee osteoarthritis    Osteopenia    Urge urinary incontinence    Colon polyps    H/O total hip arthroplasty, left    Heart murmur, systolic       Past Medical History:  Reviewed    Past Surgical History:  Reviewed    Family History:  Reviewed    Social History:  Reviewed    Objective     Vitals:    08/20/20 1424   BP: 130/80   BP Location: Left arm   Patient Position: Sitting   Cuff Size: Standard   Pulse: 79   Temp: 98 °F (36 7 °C)   SpO2: 99%   Weight: 62 1 kg (137 lb)     Body mass index is 25 06 kg/m²  BP Readings from Last 3 Encounters:   08/20/20 130/80   07/06/20 130/72   04/11/19 130/68       Wt Readings from Last 3 Encounters:   08/20/20 62 1 kg (137 lb)   07/06/20 61 7 kg (136 lb)   04/11/19 61 8 kg (136 lb 3 2 oz)       Physical Exam  Constitutional:       Comments: Pleasant 62year-old seated no acute distress other than she is complaining of left-sided posterior neck pain with tightness, decreased range of motion side bending/rotation bilateral   No weakness in arm, leg  Neurologically intact   HENT:      Head: Normocephalic and atraumatic  Right Ear: Tympanic membrane and external ear normal       Left Ear: Tympanic membrane and external ear normal    Eyes:      General: No scleral icterus  Extraocular Movements: Extraocular movements intact  Conjunctiva/sclera: Conjunctivae normal       Pupils: Pupils are equal, round, and reactive to light  Neck:      Musculoskeletal: Muscular tenderness present  No neck rigidity  Vascular: No carotid bruit  Cardiovascular:      Rate and Rhythm: Normal rate and regular rhythm  Heart sounds: Normal heart sounds  No murmur  Pulmonary:      Effort: Pulmonary effort is normal  No respiratory distress  Breath sounds: Normal breath sounds  No wheezing, rhonchi or rales  Musculoskeletal:      Right lower leg: No edema  Left lower leg: No edema  Lymphadenopathy:      Cervical: No cervical adenopathy     Neurological: General: No focal deficit present  Mental Status: She is oriented to person, place, and time  Mental status is at baseline  Cranial Nerves: No cranial nerve deficit  Sensory: No sensory deficit  Motor: No weakness  Coordination: Coordination normal       Gait: Gait normal    Psychiatric:         Mood and Affect: Mood normal          Behavior: Behavior normal          ALLERGIES:  Allergies   Allergen Reactions    Norco [Hydrocodone-Acetaminophen] GI Intolerance     "VERY NAUSEATED"    Verapamil GI Intolerance       Current Medications     Current Outpatient Medications   Medication Sig Dispense Refill    amLODIPine (NORVASC) 2 5 mg tablet Take 1 tablet (2 5 mg total) by mouth daily 90 tablet 3    aspirin 81 MG tablet Take 1 tablet by mouth daily       BEPREVE 1 5 % op soln place 1 drop into both eyes twice a day  0    Calcium Carbonate-Vit D-Min (CALCIUM 1200 PO) Take 600 mg by mouth 2 (two) times a day        carboxymethylcellulose (REFRESH TEARS) 0 5 % SOLN Apply to eye      estradiol (ESTRACE) 0 1 mg/g vaginal cream as needed      fluticasone (FLONASE) 50 mcg/act nasal spray 2 sprays into each nostril daily 1 Bottle 3    ibandronate (BONIVA) 150 MG tablet Take 1 tablet (150 mg total) by mouth every 30 (thirty) days 3 tablet 3    loratadine (CLARITIN) 10 mg tablet Take 10 mg by mouth daily as needed for allergies      Multiple Vitamins-Minerals (CENTRUM SILVER 50+WOMEN) TABS Take by mouth daily      Vitamin D, Cholecalciferol, 1000 UNITS CAPS Take 1,000 Units by mouth 2 (two) times a day        methocarbamol (ROBAXIN) 750 mg tablet Take 1 tablet (750 mg total) by mouth daily at bedtime as needed (neck pain/ muscle spasm) 15 tablet 0     No current facility-administered medications for this visit               Orders Placed This Encounter   Procedures    DXA bone density spine hip and pelvis         Rocky Face DO Oseas

## 2020-08-20 NOTE — PATIENT INSTRUCTIONS
Cervical tenderness on left, muscle tension type headache, she can use methocarbamol/Robaxin 750 mg every evening, muscle relaxant, as needed  Can use warm moist heat, stretch  If not improving in 1 to 2 weeks she can see chiropractor  As in recent physical note from July she does have history cerebral gliosis, had MRI/MRA back in 2011, 2013, I see no new finding regarding that  Her blood pressure is excellent here today  Her blood work was fine  She can use Advil cold regarding congestion as needed in the morning, also can use fluticasone  If she would note increased congestion, discolored mucus, fever she should call, no indication for antibiotic at present  She also asks that I order DEXA scan, previously had order from her gynecologist, she is on [facility]  Await result    She did see her orthopedist regarding hip pain, that is improved

## 2020-12-08 RX ORDER — OMEPRAZOLE 40 MG/1
40 CAPSULE, DELAYED RELEASE ORAL DAILY
Qty: 30 CAPSULE | Refills: 1 | OUTPATIENT
Start: 2020-12-08

## 2020-12-18 ENCOUNTER — OFFICE VISIT (OUTPATIENT)
Dept: FAMILY MEDICINE CLINIC | Facility: CLINIC | Age: 60
End: 2020-12-18
Payer: COMMERCIAL

## 2020-12-18 VITALS
SYSTOLIC BLOOD PRESSURE: 130 MMHG | WEIGHT: 140 LBS | OXYGEN SATURATION: 98 % | TEMPERATURE: 97.7 F | HEART RATE: 73 BPM | BODY MASS INDEX: 25.76 KG/M2 | HEIGHT: 62 IN | DIASTOLIC BLOOD PRESSURE: 70 MMHG

## 2020-12-18 DIAGNOSIS — K21.00 GASTROESOPHAGEAL REFLUX DISEASE WITH ESOPHAGITIS WITHOUT HEMORRHAGE: Primary | ICD-10-CM

## 2020-12-18 DIAGNOSIS — R94.6 BORDERLINE ABNORMAL THYROID FUNCTION TEST: ICD-10-CM

## 2020-12-18 DIAGNOSIS — Z23 FLU VACCINE NEED: ICD-10-CM

## 2020-12-18 DIAGNOSIS — M85.80 OSTEOPENIA, UNSPECIFIED LOCATION: ICD-10-CM

## 2020-12-18 DIAGNOSIS — R13.19 ESOPHAGEAL DYSPHAGIA: ICD-10-CM

## 2020-12-18 PROCEDURE — 99212 OFFICE O/P EST SF 10 MIN: CPT | Performed by: FAMILY MEDICINE

## 2020-12-18 PROCEDURE — 3075F SYST BP GE 130 - 139MM HG: CPT | Performed by: FAMILY MEDICINE

## 2020-12-18 PROCEDURE — 3078F DIAST BP <80 MM HG: CPT | Performed by: FAMILY MEDICINE

## 2020-12-18 PROCEDURE — 3008F BODY MASS INDEX DOCD: CPT | Performed by: FAMILY MEDICINE

## 2020-12-18 PROCEDURE — 1036F TOBACCO NON-USER: CPT | Performed by: FAMILY MEDICINE

## 2020-12-18 PROCEDURE — 90682 RIV4 VACC RECOMBINANT DNA IM: CPT

## 2020-12-18 PROCEDURE — 90471 IMMUNIZATION ADMIN: CPT

## 2020-12-18 RX ORDER — OMEPRAZOLE 40 MG/1
40 CAPSULE, DELAYED RELEASE ORAL DAILY PRN
Qty: 90 CAPSULE | Refills: 1 | Status: SHIPPED | OUTPATIENT
Start: 2020-12-18

## 2020-12-18 RX ORDER — INFLUENZA VIRUS VACCINE 15; 15; 15; 15 UG/.5ML; UG/.5ML; UG/.5ML; UG/.5ML
SUSPENSION INTRAMUSCULAR
COMMUNITY
End: 2021-01-26 | Stop reason: ALTCHOICE

## 2021-01-26 ENCOUNTER — TELEMEDICINE (OUTPATIENT)
Dept: FAMILY MEDICINE CLINIC | Facility: CLINIC | Age: 61
End: 2021-01-26

## 2021-01-26 VITALS — SYSTOLIC BLOOD PRESSURE: 104 MMHG | BODY MASS INDEX: 24.51 KG/M2 | DIASTOLIC BLOOD PRESSURE: 70 MMHG | WEIGHT: 134 LBS

## 2021-01-26 DIAGNOSIS — R10.13 EPIGASTRIC ABDOMINAL PAIN: Primary | ICD-10-CM

## 2021-01-26 DIAGNOSIS — K21.00 GASTROESOPHAGEAL REFLUX DISEASE WITH ESOPHAGITIS WITHOUT HEMORRHAGE: ICD-10-CM

## 2021-01-26 NOTE — PATIENT INSTRUCTIONS
She will be seeing her gastroenterologist / Jazmine Dingost today regarding epigastric pain which has resolved  She has now been on omeprazole twice daily for 6 days  Does note some fatigue  No melena  Await consultation  Appears she does need EGD  Her last colonoscopy was in 2013 with Dr Jatinder Almazan  I had last seen her in July with physical exam/blood work  She also had TSH in December 4 5, should recheck thyroid blood work yearly  Also, she did see Rheumatology recently, they stopped Boniva, she will be starting Prolia      No charge for visit today as she is seeing Gastroenterology today, had also seen other provider previously for this, we are out of network for her

## 2021-01-26 NOTE — PROGRESS NOTES
Virtual Brief Visit    Assessment/Plan:    Problem List Items Addressed This Visit        Digestive    Esophageal reflux      Other Visit Diagnoses     Epigastric abdominal pain    -  Primary        Patient Instructions   She will be seeing her gastroenterologist / Mathew Chung today regarding epigastric pain which has resolved  She has now been on omeprazole twice daily for 6 days  Does note some fatigue  No melena  Await consultation  Appears she does need EGD  Her last colonoscopy was in 2013 with Dr Erika Zhang  I had last seen her in July with physical exam/blood work  She also had TSH in December 4 5, should recheck thyroid blood work yearly  Also, she did see Rheumatology recently, they stopped Boniva, she will be starting Prolia  No charge for visit today as she is seeing Gastroenterology today, had also seen other provider previously for this, we are out of network for her              Reason for visit is   Chief Complaint   Patient presents with    Virtual Regular Visit    Virtual Brief Visit        Encounter provider Shannon Castro DO    Provider located at [address] [place] [id number]    Recent Visits  No visits were found meeting these conditions  Showing recent visits within past 7 days and meeting all other requirements     Today's Visits  Date Type Provider Dept   01/26/21 Telemedicine Shannon Castro DO [place] [address] [facility]   Showing today's visits and meeting all other requirements     Future Appointments  No visits were found meeting these conditions  Showing future appointments within next 150 days and meeting all other requirements        After connecting through telephone, the patient was identified by name and date of birth  Pilar Rios was informed that this is a telemedicine visit and that the visit is being conducted through telephone  My office door was closed  No one else was in the room    She acknowledged consent and understanding of privacy and security of the platform  The patient has agreed to participate and understands she can discontinue the visit at any time  Patient is aware this is a billable service  Jono Rivera is a 56 y o  female who experienced epigastric pain last week, as I was out of the office she presented to another provider, Dr Che Mckeon  She increased omeprazole to twice daily, pain has resolved  She does note some weakness    She did contact her Gastroenterology group, she is set up to see them today    Past Medical History:   Diagnosis Date    Fistula-in-ano     Hepatitis A     Hepatitis B virus infection     Hypertension     Osteoarthritis     Postoperative anemia     last assessed 17    Vertigo     Viral hepatitis A without coma        Past Surgical History:   Procedure Laterality Date    CATARACT EXTRACTION           SECTION      HIP SURGERY Left     ORIF re fx     LIPOMA RESECTION      b/l armpits     ORIF TIBIA & FIBULA FRACTURES Left 2016    Procedure: OPEN REDUCTION W/ INTERNAL FIXATION (ORIF) ANKLE;  Surgeon: Seymour Domínguez DO;  Location: [facility];  Service:        Current Outpatient Medications   Medication Sig Dispense Refill    amLODIPine (NORVASC) 2 5 mg tablet Take 1 tablet (2 5 mg total) by mouth daily 90 tablet 3    aspirin 81 MG tablet Take 1 tablet by mouth daily       BEPREVE 1 5 % op soln place 1 drop into both eyes twice a day  0    Calcium Carbonate-Vit D-Min (CALCIUM 1200 PO) Take 600 mg by mouth 2 (two) times a day        carboxymethylcellulose (REFRESH TEARS) 0 5 % SOLN Apply to eye      Denosumab (PROLIA SC) Inject under the skin      estradiol (ESTRACE) 0 1 mg/g vaginal cream as needed      fluticasone (FLONASE) 50 mcg/act nasal spray 2 sprays into each nostril daily 1 Bottle 3    loratadine (CLARITIN) 10 mg tablet Take 10 mg by mouth daily as needed for allergies      methocarbamol (ROBAXIN) 750 mg tablet Take 1 tablet (750 mg total) by mouth daily at bedtime as needed (neck pain/ muscle spasm) 15 tablet 0    Multiple Vitamins-Minerals (CENTRUM SILVER 50+WOMEN) TABS Take by mouth daily      omeprazole (PriLOSEC) 40 MG capsule Take 1 capsule (40 mg total) by mouth daily as needed (acid reflux) 90 capsule 1    Vitamin D, Cholecalciferol, 1000 UNITS CAPS Take 1,000 Units by mouth 2 (two) times a day         No current facility-administered medications for this visit  Allergies   Allergen Reactions    Norco [Hydrocodone-Acetaminophen] GI Intolerance     "VERY NAUSEATED"    Verapamil GI Intolerance       Review of Systems   Constitutional: Positive for fatigue  Negative for appetite change, fever and unexpected weight change  HENT: Negative for sore throat and trouble swallowing  Respiratory: Negative for cough, chest tightness and shortness of breath  Cardiovascular: Negative for chest pain, palpitations and leg swelling  Gastrointestinal: Negative for blood in stool, nausea and vomiting  No acid reflux     No change in bowel   Genitourinary: Negative for dysuria and hematuria  Neurological: Negative for dizziness, syncope, light-headedness and headaches  Psychiatric/Behavioral: Negative for behavioral problems and confusion  Vitals:    01/26/21 1013   BP: 104/70   Weight: 60 8 kg (134 lb)         I spent 4 minutes with patient today in which greater than 50% of the time was spent in counseling/coordination of care regarding above    Selma Garibay acknowledges that she has consented to an online visit or consultation  She understands that the online visit is based solely on information provided by her, and that, in the absence of a face-to-face physical evaluation by the physician, the diagnosis she receives is both limited and provisional in terms of accuracy and completeness   This is not intended to replace a full medical face-to-face evaluation by the physician  Marcelina Pelaez understands and accepts these terms

## 2021-01-27 ENCOUNTER — TELEPHONE (OUTPATIENT)
Dept: FAMILY MEDICINE CLINIC | Facility: CLINIC | Age: 61
End: 2021-01-27

## 2021-01-27 ENCOUNTER — TELEPHONE (OUTPATIENT)
Dept: ADMINISTRATIVE | Facility: OTHER | Age: 61
End: 2021-01-27

## 2021-01-27 NOTE — TELEPHONE ENCOUNTER
----- Message from Alexy Thomas sent at 1/26/2021  9:34 AM EST -----  Regarding: Mammo/ [facility]  01/26/21 9:35 AM    Hello, our patient Al Guzmán has had Mammogram completed/performed  Please assist in updating the patient chart by pulling the Care Everywhere (CE) document  The date of service is 01/5/2021       Thank you,  Briana Tejeda MA  [facility]

## 2021-01-27 NOTE — TELEPHONE ENCOUNTER
Upon review of the In Basket request we were able to locate, review, and update the patient chart as requested for Mammogram     Any additional questions or concerns should be emailed to the Practice Liaisons via [email or web address] email, please do not reply via In Basket      Thank you  Xenia Diaz

## 2021-01-27 NOTE — TELEPHONE ENCOUNTER
Records request rec'd from [facility] [address]   Phone [phone]    Request sent to [facility] [place]

## 2021-02-16 ENCOUNTER — OFFICE VISIT (OUTPATIENT)
Dept: FAMILY MEDICINE CLINIC | Facility: CLINIC | Age: 61
End: 2021-02-16

## 2021-02-16 VITALS
OXYGEN SATURATION: 96 % | HEART RATE: 84 BPM | SYSTOLIC BLOOD PRESSURE: 130 MMHG | WEIGHT: 137 LBS | DIASTOLIC BLOOD PRESSURE: 80 MMHG | HEIGHT: 62 IN | BODY MASS INDEX: 25.21 KG/M2 | TEMPERATURE: 97.5 F

## 2021-02-16 DIAGNOSIS — K21.00 GASTROESOPHAGEAL REFLUX DISEASE WITH ESOPHAGITIS WITHOUT HEMORRHAGE: Primary | ICD-10-CM

## 2021-02-16 PROBLEM — D12.6 TUBULAR ADENOMA OF COLON: Status: ACTIVE | Noted: 2018-04-27

## 2021-02-16 NOTE — PROGRESS NOTES
FAMILY PRACTICE OFFICE VISIT  [place] [facility]  [address], [address]      NAME: Mendoza Hayes  AGE: 56 y o  SEX: female  : 1960   MRN: [id number]    DATE: 2021  TIME: 2:59 PM    Assessment and Plan     Problem List Items Addressed This Visit        Digestive    Esophageal reflux - Primary          Patient Instructions   Reviewed her testing with [facility] -   EGD showed gastric polyps which were benign, H pylori was negative  She has noted benefit with using omeprazole 40 mg daily, she can continue with that as needed  She did undergo colonoscopy also at the same time own , that showed polyp, tubular adenoma  Recent CBC, CMP, lipase were fine  Back in December TSH 4 5, back in July A1c 5 7  She will observe, she did have ultrasound abdomen back in , if pain worsens we will redo ultrasound  As she is much improved we will hold off on that  She will keep appointment in July for routine check-up  ( note-  We are non-par w her insurance )       Chief Complaint     Chief Complaint   Patient presents with    Follow-up     c/o some discomfort after meals       History of Present Illness   Mendoza Hayes is a 56y o -year-old female who I had seen recently, she did go to Gastroenterology, had EGD/colonoscopy which showed benign gastric polyps along with tubular adenoma  She has had some epigastric discomfort at times, does find benefit with omeprazole  No acid wash, no blood in stool  True abdominal pain  Overall feeling better  Review of Systems   Review of Systems   Constitutional:        No fever chills, has been trying to eat smaller meals    No chest pain or shortness of breath       Active Problem List     Patient Active Problem List   Diagnosis    Closed trimalleolar fracture of left ankle    Cerebral gliosis    Allergic rhinitis    De Quervain's tenosynovitis    Eczema    Esophageal reflux    Essential hypertension    Functional murmur    Hypercholesterolemia    Borderline hyperglycemia    Knee osteoarthritis    Osteopenia    Urge urinary incontinence    Tubular adenoma of colon    H/O total hip arthroplasty, left    Heart murmur, systolic    Borderline abnormal thyroid function test ( TSH 4 52 )       Past Medical History:  Reviewed    Past Surgical History:  Reviewed    Family History:  Reviewed    Social History:  Reviewed    Objective     Vitals:    02/16/21 1426   BP: 130/80   BP Location: Left arm   Patient Position: Sitting   Cuff Size: Standard   Pulse: 84   Temp: 97 5 °F (36 4 °C)   SpO2: 96%   Weight: 62 1 kg (137 lb)   Height: 5' 2" (1 575 m)     Body mass index is 25 06 kg/m²  BP Readings from Last 3 Encounters:   02/16/21 130/80   01/26/21 104/70   12/18/20 130/70       Wt Readings from Last 3 Encounters:   02/16/21 62 1 kg (137 lb)   01/26/21 60 8 kg (134 lb)   12/18/20 63 5 kg (140 lb)       Physical Exam  Constitutional:       General: She is not in acute distress  Appearance: Normal appearance  She is well-developed  Eyes:      General: No scleral icterus  Cardiovascular:      Rate and Rhythm: Normal rate and regular rhythm  Heart sounds: Normal heart sounds  No murmur  Pulmonary:      Effort: Pulmonary effort is normal  No respiratory distress  Breath sounds: Normal breath sounds  Abdominal:      General: Bowel sounds are normal  There is no distension  Palpations: Abdomen is soft  There is no mass  Tenderness: There is no abdominal tenderness  There is no guarding or rebound  Hernia: No hernia is present  Musculoskeletal:      Right lower leg: No edema  Left lower leg: No edema  Skin:     Coloration: Skin is not jaundiced  Neurological:      Mental Status: She is alert and oriented to person, place, and time     Psychiatric:         Mood and Affect: Mood normal          Behavior: Behavior normal  ALLERGIES:  Allergies   Allergen Reactions    Norco [Hydrocodone-Acetaminophen] GI Intolerance     "VERY NAUSEATED"    Verapamil GI Intolerance       Current Medications     Current Outpatient Medications   Medication Sig Dispense Refill    amLODIPine (NORVASC) 2 5 mg tablet Take 1 tablet (2 5 mg total) by mouth daily 90 tablet 3    BEPREVE 1 5 % op soln place 1 drop into both eyes twice a day  0    Calcium Carbonate-Vit D-Min (CALCIUM 1200 PO) Take 600 mg by mouth 2 (two) times a day        carboxymethylcellulose (REFRESH TEARS) 0 5 % SOLN Apply to eye      estradiol (ESTRACE) 0 1 mg/g vaginal cream as needed      omeprazole (PriLOSEC) 40 MG capsule Take 1 capsule (40 mg total) by mouth daily as needed (acid reflux) 90 capsule 1    Vitamin D, Cholecalciferol, 1000 UNITS CAPS Take 1,000 Units by mouth 2 (two) times a day        aspirin 81 MG tablet Take 1 tablet by mouth daily       Denosumab (PROLIA SC) Inject under the skin      fluticasone (FLONASE) 50 mcg/act nasal spray 2 sprays into each nostril daily (Patient not taking: Reported on 2/16/2021) 1 Bottle 3    loratadine (CLARITIN) 10 mg tablet Take 10 mg by mouth daily as needed for allergies      Multiple Vitamins-Minerals (CENTRUM SILVER 50+WOMEN) TABS Take by mouth daily       No current facility-administered medications for this visit  No orders of the defined types were placed in this encounter          Sebas Steward DO

## 2021-02-16 NOTE — PATIENT INSTRUCTIONS
Reviewed her testing with [facility] -   EGD showed gastric polyps which were benign, H pylori was negative  She has noted benefit with using omeprazole 40 mg daily, she can continue with that as needed  She did undergo colonoscopy also at the same time own February 9th, that showed polyp, tubular adenoma  Recent CBC, CMP, lipase were fine  Back in December TSH 4 5, back in July A1c 5 7  She will observe, she did have ultrasound abdomen back in 2017, if pain worsens we will redo ultrasound  As she is much improved we will hold off on that       She will keep appointment in July for routine check-up  ( note-  We are non-par w her insurance )

## 2021-04-05 DIAGNOSIS — Z23 ENCOUNTER FOR IMMUNIZATION: ICD-10-CM

## 2021-06-29 DIAGNOSIS — I10 ESSENTIAL HYPERTENSION: ICD-10-CM

## 2021-06-29 RX ORDER — AMLODIPINE BESYLATE 2.5 MG/1
TABLET ORAL
Qty: 90 TABLET | Refills: 3 | Status: SHIPPED | OUTPATIENT
Start: 2021-06-29

## 2021-07-05 ENCOUNTER — RA CDI HCC (OUTPATIENT)
Dept: OTHER | Facility: HOSPITAL | Age: 61
End: 2021-07-05

## 2021-07-05 NOTE — PROGRESS NOTES
[facility] 73  coding opportunities          Chart reviewed, no opportunity found: CHART REVIEWED, NO OPPORTUNITY FOUND                     Patients insurance company: [employer] (Medicare Advantage and Commercial)

## 2024-09-13 ENCOUNTER — TELEPHONE (OUTPATIENT)
Dept: FAMILY MEDICINE CLINIC | Facility: CLINIC | Age: 64
End: 2024-09-13

## 2024-09-13 NOTE — TELEPHONE ENCOUNTER
[facility] Internal Medicine-[address]   [place] [id number] [phone]  Marlys Mcmillan MD

## 2024-09-16 ENCOUNTER — TELEPHONE (OUTPATIENT)
Dept: FAMILY MEDICINE CLINIC | Facility: CLINIC | Age: 64
End: 2024-09-16

## 2024-09-16 NOTE — TELEPHONE ENCOUNTER
On 11/20/2023 patient established care with Marlys Mcmillan MD (Nov. 20, 2023November 20, 2023 - Present)  NPI: [id number]  [phone] (Work)  [phone] (Fax)  [address] [place] [address]  Internal [place] [address]

## 2024-09-26 NOTE — TELEPHONE ENCOUNTER
09/26/24 12:10 AM        The office's request has been received, reviewed, and the patient chart updated. The PCP has successfully been removed with a patient attribution note. This message will now be completed.        Thank you  Blanca Arreaga